# Patient Record
Sex: FEMALE | Race: WHITE | NOT HISPANIC OR LATINO | URBAN - METROPOLITAN AREA
[De-identification: names, ages, dates, MRNs, and addresses within clinical notes are randomized per-mention and may not be internally consistent; named-entity substitution may affect disease eponyms.]

---

## 2017-03-03 ENCOUNTER — EMERGENCY (EMERGENCY)
Facility: HOSPITAL | Age: 37
LOS: 1 days | Discharge: HOME | End: 2017-03-03
Attending: EMERGENCY MEDICINE
Payer: MEDICAID

## 2017-03-03 VITALS — HEIGHT: 65 IN | WEIGHT: 139.99 LBS

## 2017-03-03 VITALS — HEART RATE: 89 BPM

## 2017-03-03 DIAGNOSIS — R10.13 EPIGASTRIC PAIN: ICD-10-CM

## 2017-03-03 DIAGNOSIS — Z88.5 ALLERGY STATUS TO NARCOTIC AGENT: ICD-10-CM

## 2017-03-03 DIAGNOSIS — Z98.82 BREAST IMPLANT STATUS: Chronic | ICD-10-CM

## 2017-03-03 DIAGNOSIS — E11.9 TYPE 2 DIABETES MELLITUS WITHOUT COMPLICATIONS: ICD-10-CM

## 2017-03-03 DIAGNOSIS — M16.9 OSTEOARTHRITIS OF HIP, UNSPECIFIED: Chronic | ICD-10-CM

## 2017-03-03 DIAGNOSIS — Z98.84 BARIATRIC SURGERY STATUS: Chronic | ICD-10-CM

## 2017-03-03 DIAGNOSIS — Z90.89 ACQUIRED ABSENCE OF OTHER ORGANS: ICD-10-CM

## 2017-03-03 DIAGNOSIS — Z88.6 ALLERGY STATUS TO ANALGESIC AGENT: ICD-10-CM

## 2017-03-03 DIAGNOSIS — J45.909 UNSPECIFIED ASTHMA, UNCOMPLICATED: ICD-10-CM

## 2017-03-03 LAB
ALBUMIN SERPL ELPH-MCNC: 4.5 G/DL — SIGNIFICANT CHANGE UP (ref 3.3–5.2)
ALP SERPL-CCNC: 79 U/L — SIGNIFICANT CHANGE UP (ref 40–120)
ALT FLD-CCNC: 11 U/L — SIGNIFICANT CHANGE UP
ANION GAP SERPL CALC-SCNC: 12 MMOL/L — SIGNIFICANT CHANGE UP (ref 5–17)
APPEARANCE UR: CLEAR — SIGNIFICANT CHANGE UP
AST SERPL-CCNC: 16 U/L — SIGNIFICANT CHANGE UP
BASOPHILS # BLD AUTO: 0 K/UL — SIGNIFICANT CHANGE UP (ref 0–0.2)
BASOPHILS NFR BLD AUTO: 0.4 % — SIGNIFICANT CHANGE UP (ref 0–2)
BILIRUB SERPL-MCNC: 0.2 MG/DL — LOW (ref 0.4–2)
BILIRUB UR-MCNC: NEGATIVE — SIGNIFICANT CHANGE UP
BUN SERPL-MCNC: 15 MG/DL — SIGNIFICANT CHANGE UP (ref 8–20)
CALCIUM SERPL-MCNC: 9.3 MG/DL — SIGNIFICANT CHANGE UP (ref 8.6–10.2)
CHLORIDE SERPL-SCNC: 104 MMOL/L — SIGNIFICANT CHANGE UP (ref 98–107)
CO2 SERPL-SCNC: 25 MMOL/L — SIGNIFICANT CHANGE UP (ref 22–29)
COLOR SPEC: YELLOW — SIGNIFICANT CHANGE UP
CREAT SERPL-MCNC: 0.53 MG/DL — SIGNIFICANT CHANGE UP (ref 0.5–1.3)
DIFF PNL FLD: ABNORMAL
EOSINOPHIL # BLD AUTO: 0.1 K/UL — SIGNIFICANT CHANGE UP (ref 0–0.5)
EOSINOPHIL NFR BLD AUTO: 1.9 % — SIGNIFICANT CHANGE UP (ref 0–6)
EPI CELLS # UR: SIGNIFICANT CHANGE UP
GLUCOSE SERPL-MCNC: 100 MG/DL — SIGNIFICANT CHANGE UP (ref 70–115)
GLUCOSE UR QL: NEGATIVE MG/DL — SIGNIFICANT CHANGE UP
HCG SERPL-ACNC: <2 MIU/ML — SIGNIFICANT CHANGE UP
HCG UR QL: NEGATIVE — SIGNIFICANT CHANGE UP
HCT VFR BLD CALC: 37.9 % — SIGNIFICANT CHANGE UP (ref 37–47)
HGB BLD-MCNC: 11.8 G/DL — LOW (ref 12–16)
KETONES UR-MCNC: NEGATIVE — SIGNIFICANT CHANGE UP
LEUKOCYTE ESTERASE UR-ACNC: NEGATIVE — SIGNIFICANT CHANGE UP
LIDOCAIN IGE QN: 24 U/L — SIGNIFICANT CHANGE UP (ref 22–51)
LYMPHOCYTES # BLD AUTO: 2.1 K/UL — SIGNIFICANT CHANGE UP (ref 1–4.8)
LYMPHOCYTES # BLD AUTO: 30.9 % — SIGNIFICANT CHANGE UP (ref 20–55)
MCHC RBC-ENTMCNC: 27.3 PG — SIGNIFICANT CHANGE UP (ref 27–31)
MCHC RBC-ENTMCNC: 31.1 G/DL — LOW (ref 32–36)
MCV RBC AUTO: 87.5 FL — SIGNIFICANT CHANGE UP (ref 81–99)
MONOCYTES # BLD AUTO: 0.7 K/UL — SIGNIFICANT CHANGE UP (ref 0–0.8)
MONOCYTES NFR BLD AUTO: 10.6 % — HIGH (ref 3–10)
NEUTROPHILS # BLD AUTO: 3.8 K/UL — SIGNIFICANT CHANGE UP (ref 1.8–8)
NEUTROPHILS NFR BLD AUTO: 56.1 % — SIGNIFICANT CHANGE UP (ref 37–73)
NITRITE UR-MCNC: NEGATIVE — SIGNIFICANT CHANGE UP
PH UR: 6.5 — SIGNIFICANT CHANGE UP (ref 4.8–8)
PLATELET # BLD AUTO: 334 K/UL — SIGNIFICANT CHANGE UP (ref 150–400)
POTASSIUM SERPL-MCNC: 3.9 MMOL/L — SIGNIFICANT CHANGE UP (ref 3.5–5.3)
POTASSIUM SERPL-SCNC: 3.9 MMOL/L — SIGNIFICANT CHANGE UP (ref 3.5–5.3)
PROT SERPL-MCNC: 7.4 G/DL — SIGNIFICANT CHANGE UP (ref 6.6–8.7)
PROT UR-MCNC: NEGATIVE MG/DL — SIGNIFICANT CHANGE UP
RBC # BLD: 4.33 M/UL — LOW (ref 4.4–5.2)
RBC # FLD: 17.4 % — HIGH (ref 11–15.6)
RBC CASTS # UR COMP ASSIST: ABNORMAL /HPF (ref 0–4)
SODIUM SERPL-SCNC: 141 MMOL/L — SIGNIFICANT CHANGE UP (ref 135–145)
SP GR SPEC: 1.01 — SIGNIFICANT CHANGE UP (ref 1.01–1.02)
UROBILINOGEN FLD QL: NEGATIVE MG/DL — SIGNIFICANT CHANGE UP
WBC # BLD: 6.8 K/UL — SIGNIFICANT CHANGE UP (ref 4.8–10.8)
WBC # FLD AUTO: 6.8 K/UL — SIGNIFICANT CHANGE UP (ref 4.8–10.8)
WBC UR QL: SIGNIFICANT CHANGE UP

## 2017-03-03 PROCEDURE — 99284 EMERGENCY DEPT VISIT MOD MDM: CPT | Mod: 25

## 2017-03-03 PROCEDURE — 96374 THER/PROPH/DIAG INJ IV PUSH: CPT | Mod: XU

## 2017-03-03 PROCEDURE — 81025 URINE PREGNANCY TEST: CPT

## 2017-03-03 PROCEDURE — 74177 CT ABD & PELVIS W/CONTRAST: CPT

## 2017-03-03 PROCEDURE — 80053 COMPREHEN METABOLIC PANEL: CPT

## 2017-03-03 PROCEDURE — 83690 ASSAY OF LIPASE: CPT

## 2017-03-03 PROCEDURE — 74177 CT ABD & PELVIS W/CONTRAST: CPT | Mod: 26

## 2017-03-03 PROCEDURE — 84702 CHORIONIC GONADOTROPIN TEST: CPT

## 2017-03-03 PROCEDURE — 99284 EMERGENCY DEPT VISIT MOD MDM: CPT

## 2017-03-03 PROCEDURE — 81001 URINALYSIS AUTO W/SCOPE: CPT

## 2017-03-03 PROCEDURE — 85027 COMPLETE CBC AUTOMATED: CPT

## 2017-03-03 RX ORDER — ACETAMINOPHEN 500 MG
1000 TABLET ORAL ONCE
Qty: 0 | Refills: 0 | Status: COMPLETED | OUTPATIENT
Start: 2017-03-03 | End: 2017-03-03

## 2017-03-03 RX ORDER — SODIUM CHLORIDE 9 MG/ML
1000 INJECTION INTRAMUSCULAR; INTRAVENOUS; SUBCUTANEOUS ONCE
Qty: 0 | Refills: 0 | Status: COMPLETED | OUTPATIENT
Start: 2017-03-03 | End: 2017-03-03

## 2017-03-03 RX ADMIN — Medication 400 MILLIGRAM(S): at 12:13

## 2017-03-03 RX ADMIN — SODIUM CHLORIDE 1000 MILLILITER(S): 9 INJECTION INTRAMUSCULAR; INTRAVENOUS; SUBCUTANEOUS at 12:13

## 2017-03-03 NOTE — ED ADULT NURSE REASSESSMENT NOTE - NS ED NURSE REASSESS COMMENT FT1
Pt baseline mental status, upon return to ED from CT, pt sitting in WC refusing to go to RW6 stating "there's no privacy over there" "I'm not sitting in those chairs" "Everybody can see your business". RN JAYME placed call to charge nurse Sadie to mediate situation and speak with patient @ this time. Zabrina Amaro @ bedside @ this time for mediation.

## 2017-03-03 NOTE — ED STATDOCS - PSH
Labral tear of hip, degenerative    S/P Abdominoplasty  2004  S/P bilateral breast implants    S/P Cholecystectomy  2007  S/P gastric bypass  2010  Vaginocele  s/p repair

## 2017-03-03 NOTE — ED ADULT NURSE REASSESSMENT NOTE - NS ED NURSE REASSESS COMMENT FT1
upon dc pt argumentative cursing  states that she doesn't agree with her negative results.  prior in the visit pt self report past abuse of narcotics.  pt refusing vitals ripped out her own iv and left the facility

## 2017-03-03 NOTE — ED STATDOCS - MEDICAL DECISION MAKING DETAILS
abdominal pain possible bowel obstruction, Labs, CT scan abdominal pain: possible partial bowel obstruction vs gastric outlet obstruction.  Labs, CT scan

## 2017-03-03 NOTE — ED STATDOCS - PMH
Alcohol abuse    Asthma  last attack 2 YEARS AGO  Chronic back pain    Intussusception intestine  2013  Other type of migraine    Palpitations  pvc's  Type 2 diabetes mellitus without complication

## 2017-03-03 NOTE — ED STATDOCS - PROGRESS NOTE DETAILS
PA NOTE: Pt seen by intake physician and hpi/orders/plan reviewed. PT presenting to ED with complaints of epigastric pain x 3 days.  Patient has hx of intussusception 2x in the past and hx of gastric ulcers.  Pt states that this feels similar to her previous episodes of intussusception.  Denies nausea/vomiting/diarrhea  PE: GEN: Awake, alert,  NAD,  EYES: PERRL CARDIAC: Reg rate and rhythm, S1,S2, RRR  RESP: No distress noted. Lungs CTA bilaterally no wheeze, ronchi, rales. ABD: soft,  non-tender, no guarding. . NEURO: AOx3, no focal deficits   PLAN: labs and CT pt takes pantoprazole - 40mg bid for ulcer

## 2017-03-03 NOTE — ED STATDOCS - ATTENDING CONTRIBUTION TO CARE
I, Pardeep Granados, performed the initial face to face bedside interview with this patient regarding history of present illness, review of symptoms and relevant past medical, social and family history.  I completed an independent physical examination.  I was the provider who initially evaluated this patient.  The history, relevant review of systems, past medical and surgical history, medical decision making, and physical examination was documented by the scribe in my presence and I attest to the accuracy of the documentation. Follow-up on ordered tests (ie labs, radiologic studies) and re-evaluation of the patient's status has been communicated to the ACP.  Disposition of the patient will be based on test outcome and response to ED interventions.

## 2017-03-03 NOTE — ED STATDOCS - NS ED MD SCRIBE ATTENDING SCRIBE SECTIONS
REVIEW OF SYSTEMS/PAST MEDICAL/SURGICAL/SOCIAL HISTORY/HISTORY OF PRESENT ILLNESS/PHYSICAL EXAM/INTAKE ASSESSMENT/SCREENINGS/DISPOSITION/HIV/VITAL SIGNS( Pullset)

## 2017-03-03 NOTE — ED STATDOCS - OBJECTIVE STATEMENT
36 y/o F pt with hx of intussusception, asthma, Diabetes, abdominoplasty, cholecystectomy, gastric bypass and ulcers presents to ED c/o intermittent sharp abdominal pain forf 3 days. She states pain is worse with PO intake.  Pain is currently a 4/10 earlier was 8/10. Last bowel movement today 7am, but not passing gas. Pt is Methadone (taken this morning), Percocet, Neurontin  Fioricet, trokendi and flexeril. No vomiting. No fevers. No further complaints at this time. 38 y/o F pt with hx of intussusception, asthma, Diabetes, abdominoplasty, cholecystectomy, gastric bypass and ulcers presents to ED c/o intermittent sharp abdominal pain for 3 days. She states pain is worse with PO intake: reports bloating and bulging of epigastrium immediately after po intake with assoc sharp, stabbing discomfort and eructation.  Pain was unbearable last night:  currently a 4/10 earlier was 8/10. Last bowel movement today 7am, but not passing gas. Pt is Methadone (taken this morning), Percocet, Neurontin  Fioricet, trokendi and flexeril. No vomiting. No fevers. No further complaints at this time.

## 2017-07-14 ENCOUNTER — EMERGENCY (EMERGENCY)
Facility: HOSPITAL | Age: 37
LOS: 1 days | Discharge: HOME | End: 2017-07-14
Attending: EMERGENCY MEDICINE | Admitting: EMERGENCY MEDICINE
Payer: MEDICAID

## 2017-07-14 VITALS
TEMPERATURE: 98 F | OXYGEN SATURATION: 100 % | SYSTOLIC BLOOD PRESSURE: 126 MMHG | DIASTOLIC BLOOD PRESSURE: 74 MMHG | RESPIRATION RATE: 20 BRPM | HEART RATE: 96 BPM

## 2017-07-14 VITALS — WEIGHT: 128.97 LBS | HEIGHT: 65 IN

## 2017-07-14 DIAGNOSIS — Z98.82 BREAST IMPLANT STATUS: Chronic | ICD-10-CM

## 2017-07-14 DIAGNOSIS — Z98.84 BARIATRIC SURGERY STATUS: Chronic | ICD-10-CM

## 2017-07-14 DIAGNOSIS — M16.9 OSTEOARTHRITIS OF HIP, UNSPECIFIED: Chronic | ICD-10-CM

## 2017-07-14 LAB
ALBUMIN SERPL ELPH-MCNC: 3.9 G/DL — SIGNIFICANT CHANGE UP (ref 3.3–5.2)
ALP SERPL-CCNC: 71 U/L — SIGNIFICANT CHANGE UP (ref 40–120)
ALT FLD-CCNC: 7 U/L — SIGNIFICANT CHANGE UP
ANION GAP SERPL CALC-SCNC: 12 MMOL/L — SIGNIFICANT CHANGE UP (ref 5–17)
APPEARANCE UR: CLEAR — SIGNIFICANT CHANGE UP
AST SERPL-CCNC: 13 U/L — SIGNIFICANT CHANGE UP
BACTERIA # UR AUTO: ABNORMAL
BASOPHILS # BLD AUTO: 0 K/UL — SIGNIFICANT CHANGE UP (ref 0–0.2)
BASOPHILS NFR BLD AUTO: 0.4 % — SIGNIFICANT CHANGE UP (ref 0–2)
BILIRUB SERPL-MCNC: 0.1 MG/DL — LOW (ref 0.4–2)
BILIRUB UR-MCNC: NEGATIVE — SIGNIFICANT CHANGE UP
BUN SERPL-MCNC: 11 MG/DL — SIGNIFICANT CHANGE UP (ref 8–20)
CALCIUM SERPL-MCNC: 8.7 MG/DL — SIGNIFICANT CHANGE UP (ref 8.6–10.2)
CHLORIDE SERPL-SCNC: 105 MMOL/L — SIGNIFICANT CHANGE UP (ref 98–107)
CK SERPL-CCNC: 73 U/L — SIGNIFICANT CHANGE UP (ref 25–170)
CO2 SERPL-SCNC: 22 MMOL/L — SIGNIFICANT CHANGE UP (ref 22–29)
COLOR SPEC: YELLOW — SIGNIFICANT CHANGE UP
CREAT SERPL-MCNC: 0.5 MG/DL — SIGNIFICANT CHANGE UP (ref 0.5–1.3)
DIFF PNL FLD: ABNORMAL
EOSINOPHIL # BLD AUTO: 0.2 K/UL — SIGNIFICANT CHANGE UP (ref 0–0.5)
EOSINOPHIL NFR BLD AUTO: 2.6 % — SIGNIFICANT CHANGE UP (ref 0–6)
EPI CELLS # UR: SIGNIFICANT CHANGE UP
GLUCOSE SERPL-MCNC: 116 MG/DL — HIGH (ref 70–115)
GLUCOSE UR QL: NEGATIVE MG/DL — SIGNIFICANT CHANGE UP
HCG SERPL-ACNC: <2 MIU/ML — SIGNIFICANT CHANGE UP
HCT VFR BLD CALC: 31.7 % — LOW (ref 37–47)
HGB BLD-MCNC: 10 G/DL — LOW (ref 12–16)
INR BLD: 0.96 RATIO — SIGNIFICANT CHANGE UP (ref 0.88–1.16)
KETONES UR-MCNC: NEGATIVE — SIGNIFICANT CHANGE UP
LEUKOCYTE ESTERASE UR-ACNC: NEGATIVE — SIGNIFICANT CHANGE UP
LIDOCAIN IGE QN: 25 U/L — SIGNIFICANT CHANGE UP (ref 22–51)
LYMPHOCYTES # BLD AUTO: 3.1 K/UL — SIGNIFICANT CHANGE UP (ref 1–4.8)
LYMPHOCYTES # BLD AUTO: 42.1 % — SIGNIFICANT CHANGE UP (ref 20–55)
MCHC RBC-ENTMCNC: 28.3 PG — SIGNIFICANT CHANGE UP (ref 27–31)
MCHC RBC-ENTMCNC: 31.5 G/DL — LOW (ref 32–36)
MCV RBC AUTO: 89.8 FL — SIGNIFICANT CHANGE UP (ref 81–99)
MONOCYTES # BLD AUTO: 0.7 K/UL — SIGNIFICANT CHANGE UP (ref 0–0.8)
MONOCYTES NFR BLD AUTO: 9.4 % — SIGNIFICANT CHANGE UP (ref 3–10)
NEUTROPHILS # BLD AUTO: 3.3 K/UL — SIGNIFICANT CHANGE UP (ref 1.8–8)
NEUTROPHILS NFR BLD AUTO: 45.4 % — SIGNIFICANT CHANGE UP (ref 37–73)
NITRITE UR-MCNC: NEGATIVE — SIGNIFICANT CHANGE UP
PH UR: 7 — SIGNIFICANT CHANGE UP (ref 5–8)
PLATELET # BLD AUTO: 412 K/UL — HIGH (ref 150–400)
POTASSIUM SERPL-MCNC: 3.9 MMOL/L — SIGNIFICANT CHANGE UP (ref 3.5–5.3)
POTASSIUM SERPL-SCNC: 3.9 MMOL/L — SIGNIFICANT CHANGE UP (ref 3.5–5.3)
PROT SERPL-MCNC: 6.7 G/DL — SIGNIFICANT CHANGE UP (ref 6.6–8.7)
PROT UR-MCNC: NEGATIVE MG/DL — SIGNIFICANT CHANGE UP
PROTHROM AB SERPL-ACNC: 10.6 SEC — SIGNIFICANT CHANGE UP (ref 9.8–12.7)
RBC # BLD: 3.53 M/UL — LOW (ref 4.4–5.2)
RBC # FLD: 15.7 % — HIGH (ref 11–15.6)
RBC CASTS # UR COMP ASSIST: SIGNIFICANT CHANGE UP /HPF (ref 0–4)
SODIUM SERPL-SCNC: 139 MMOL/L — SIGNIFICANT CHANGE UP (ref 135–145)
SP GR SPEC: 1 — LOW (ref 1.01–1.02)
UROBILINOGEN FLD QL: NEGATIVE MG/DL — SIGNIFICANT CHANGE UP
WBC # BLD: 7.3 K/UL — SIGNIFICANT CHANGE UP (ref 4.8–10.8)
WBC # FLD AUTO: 7.3 K/UL — SIGNIFICANT CHANGE UP (ref 4.8–10.8)
WBC UR QL: SIGNIFICANT CHANGE UP

## 2017-07-14 PROCEDURE — 99284 EMERGENCY DEPT VISIT MOD MDM: CPT | Mod: 25

## 2017-07-14 PROCEDURE — 81001 URINALYSIS AUTO W/SCOPE: CPT

## 2017-07-14 PROCEDURE — 85610 PROTHROMBIN TIME: CPT

## 2017-07-14 PROCEDURE — 84702 CHORIONIC GONADOTROPIN TEST: CPT

## 2017-07-14 PROCEDURE — 93971 EXTREMITY STUDY: CPT | Mod: 26,RT

## 2017-07-14 PROCEDURE — 85027 COMPLETE CBC AUTOMATED: CPT

## 2017-07-14 PROCEDURE — 36415 COLL VENOUS BLD VENIPUNCTURE: CPT

## 2017-07-14 PROCEDURE — 82550 ASSAY OF CK (CPK): CPT

## 2017-07-14 PROCEDURE — 83690 ASSAY OF LIPASE: CPT

## 2017-07-14 PROCEDURE — 80053 COMPREHEN METABOLIC PANEL: CPT

## 2017-07-14 PROCEDURE — 93971 EXTREMITY STUDY: CPT

## 2017-07-14 RX ORDER — SODIUM CHLORIDE 9 MG/ML
3 INJECTION INTRAMUSCULAR; INTRAVENOUS; SUBCUTANEOUS ONCE
Qty: 0 | Refills: 0 | Status: COMPLETED | OUTPATIENT
Start: 2017-07-14 | End: 2017-07-14

## 2017-07-14 RX ORDER — SUCRALFATE 1 G
1 TABLET ORAL
Qty: 60 | Refills: 0 | OUTPATIENT
Start: 2017-07-14 | End: 2017-08-13

## 2017-07-14 RX ORDER — MOXIFLOXACIN HYDROCHLORIDE TABLETS, 400 MG 400 MG/1
1 TABLET, FILM COATED ORAL
Qty: 20 | Refills: 0 | OUTPATIENT
Start: 2017-07-14 | End: 2017-07-24

## 2017-07-14 RX ORDER — SODIUM CHLORIDE 9 MG/ML
1000 INJECTION INTRAMUSCULAR; INTRAVENOUS; SUBCUTANEOUS ONCE
Qty: 0 | Refills: 0 | Status: COMPLETED | OUTPATIENT
Start: 2017-07-14 | End: 2017-07-14

## 2017-07-14 RX ORDER — SODIUM CHLORIDE 9 MG/ML
1000 INJECTION INTRAMUSCULAR; INTRAVENOUS; SUBCUTANEOUS
Qty: 0 | Refills: 0 | Status: DISCONTINUED | OUTPATIENT
Start: 2017-07-14 | End: 2017-07-18

## 2017-07-14 RX ORDER — ONDANSETRON 8 MG/1
1 TABLET, FILM COATED ORAL
Qty: 12 | Refills: 0 | OUTPATIENT
Start: 2017-07-14 | End: 2017-07-18

## 2017-07-14 RX ORDER — METRONIDAZOLE 500 MG
1 TABLET ORAL
Qty: 30 | Refills: 0 | OUTPATIENT
Start: 2017-07-14 | End: 2017-07-24

## 2017-07-14 RX ADMIN — SODIUM CHLORIDE 1000 MILLILITER(S): 9 INJECTION INTRAMUSCULAR; INTRAVENOUS; SUBCUTANEOUS at 04:10

## 2017-07-14 RX ADMIN — SODIUM CHLORIDE 3 MILLILITER(S): 9 INJECTION INTRAMUSCULAR; INTRAVENOUS; SUBCUTANEOUS at 04:10

## 2017-07-14 NOTE — ED PROVIDER NOTE - OBJECTIVE STATEMENT
38 yo F with diarrhea and RLE pain. sx are constant and nothing makes sx better or worse. no cp or sob. no HA. sx are constant for 2 weeks

## 2017-07-14 NOTE — ED ADULT NURSE NOTE - OBJECTIVE STATEMENT
Pt states "I've had diarrhea for 12 days, I don't want to eat because I know it will go right through me, I started to have swelling in my feet starting yesterday, they're going down though", pt c/o nausea no vomiting, able to ambulate safely and steadily, pt w/ hx of stomach ulcers, pt states she buried her cat in her backyard 2 weeks ago, doesn't know if this could be related

## 2017-07-14 NOTE — ED PROVIDER NOTE - PHYSICAL EXAMINATION
VS: reviewed in triage note...  HEENT: NC/AT,   CV:s1,s2 no m/r/g  Lungs: cta b/l, no r/r/w  Abd: soft, diffuse abd pain  EXT: no c/c/e  Neuro:no focal defecits  skin: no rash  Pulses: dpp, pt 2+ b/l

## 2017-07-14 NOTE — ED PROVIDER NOTE - CARE PLAN
Principal Discharge DX:	Diarrhea of infectious origin  Secondary Diagnosis:	Pain of right lower extremity

## 2017-09-03 ENCOUNTER — INPATIENT (INPATIENT)
Facility: HOSPITAL | Age: 37
LOS: 1 days | Discharge: HOME | DRG: 344 | End: 2017-09-05
Attending: SURGERY | Admitting: SURGERY
Payer: MEDICAID

## 2017-09-03 ENCOUNTER — EMERGENCY (EMERGENCY)
Facility: HOSPITAL | Age: 37
LOS: 1 days | Discharge: OTHER INSTITUTION | End: 2017-09-03
Attending: EMERGENCY MEDICINE
Payer: MEDICAID

## 2017-09-03 ENCOUNTER — TRANSCRIPTION ENCOUNTER (OUTPATIENT)
Age: 37
End: 2017-09-03

## 2017-09-03 VITALS
OXYGEN SATURATION: 100 % | DIASTOLIC BLOOD PRESSURE: 66 MMHG | HEART RATE: 96 BPM | RESPIRATION RATE: 16 BRPM | TEMPERATURE: 97 F | SYSTOLIC BLOOD PRESSURE: 108 MMHG

## 2017-09-03 VITALS
DIASTOLIC BLOOD PRESSURE: 87 MMHG | SYSTOLIC BLOOD PRESSURE: 130 MMHG | RESPIRATION RATE: 20 BRPM | HEART RATE: 97 BPM | OXYGEN SATURATION: 98 %

## 2017-09-03 VITALS — HEIGHT: 65 IN | WEIGHT: 125 LBS

## 2017-09-03 DIAGNOSIS — K40.00 BILATERAL INGUINAL HERNIA, WITH OBSTRUCTION, WITHOUT GANGRENE, NOT SPECIFIED AS RECURRENT: ICD-10-CM

## 2017-09-03 DIAGNOSIS — Z98.82 BREAST IMPLANT STATUS: Chronic | ICD-10-CM

## 2017-09-03 DIAGNOSIS — K40 INGUINAL HERNIA: ICD-10-CM

## 2017-09-03 DIAGNOSIS — Z98.84 BARIATRIC SURGERY STATUS: Chronic | ICD-10-CM

## 2017-09-03 DIAGNOSIS — M16.9 OSTEOARTHRITIS OF HIP, UNSPECIFIED: Chronic | ICD-10-CM

## 2017-09-03 LAB
ALBUMIN SERPL ELPH-MCNC: 4.3 G/DL — SIGNIFICANT CHANGE UP (ref 3.3–5.2)
ALP SERPL-CCNC: 67 U/L — SIGNIFICANT CHANGE UP (ref 40–120)
ALT FLD-CCNC: 18 U/L — SIGNIFICANT CHANGE UP
ANION GAP SERPL CALC-SCNC: 18 MMOL/L — HIGH (ref 5–17)
APTT BLD: 26.3 SEC — LOW (ref 27.5–37.4)
AST SERPL-CCNC: 17 U/L — SIGNIFICANT CHANGE UP
BASOPHILS # BLD AUTO: 0 K/UL — SIGNIFICANT CHANGE UP (ref 0–0.2)
BASOPHILS NFR BLD AUTO: 0.4 % — SIGNIFICANT CHANGE UP (ref 0–2)
BILIRUB SERPL-MCNC: 0.2 MG/DL — LOW (ref 0.4–2)
BUN SERPL-MCNC: 10 MG/DL — SIGNIFICANT CHANGE UP (ref 8–20)
CALCIUM SERPL-MCNC: 8.9 MG/DL — SIGNIFICANT CHANGE UP (ref 8.6–10.2)
CHLORIDE SERPL-SCNC: 107 MMOL/L — SIGNIFICANT CHANGE UP (ref 98–107)
CO2 SERPL-SCNC: 17 MMOL/L — LOW (ref 22–29)
CREAT SERPL-MCNC: 0.62 MG/DL — SIGNIFICANT CHANGE UP (ref 0.5–1.3)
EOSINOPHIL # BLD AUTO: 0.1 K/UL — SIGNIFICANT CHANGE UP (ref 0–0.5)
EOSINOPHIL NFR BLD AUTO: 1.9 % — SIGNIFICANT CHANGE UP (ref 0–6)
GLUCOSE SERPL-MCNC: 215 MG/DL — HIGH (ref 70–115)
HCT VFR BLD CALC: 35.5 % — LOW (ref 37–47)
HGB BLD-MCNC: 11 G/DL — LOW (ref 12–16)
INR BLD: 0.92 RATIO — SIGNIFICANT CHANGE UP (ref 0.88–1.16)
LACTATE BLDV-MCNC: 3.7 MMOL/L — HIGH (ref 0.5–2)
LIDOCAIN IGE QN: 33 U/L — SIGNIFICANT CHANGE UP (ref 22–51)
LYMPHOCYTES # BLD AUTO: 3.3 K/UL — SIGNIFICANT CHANGE UP (ref 1–4.8)
LYMPHOCYTES # BLD AUTO: 46.1 % — SIGNIFICANT CHANGE UP (ref 20–55)
MAGNESIUM SERPL-MCNC: 1.9 MG/DL — SIGNIFICANT CHANGE UP (ref 1.6–2.6)
MCHC RBC-ENTMCNC: 26.8 PG — LOW (ref 27–31)
MCHC RBC-ENTMCNC: 31 G/DL — LOW (ref 32–36)
MCV RBC AUTO: 86.6 FL — SIGNIFICANT CHANGE UP (ref 81–99)
MONOCYTES # BLD AUTO: 0.6 K/UL — SIGNIFICANT CHANGE UP (ref 0–0.8)
MONOCYTES NFR BLD AUTO: 7.7 % — SIGNIFICANT CHANGE UP (ref 3–10)
NEUTROPHILS # BLD AUTO: 3.2 K/UL — SIGNIFICANT CHANGE UP (ref 1.8–8)
NEUTROPHILS NFR BLD AUTO: 43.8 % — SIGNIFICANT CHANGE UP (ref 37–73)
PLATELET # BLD AUTO: 444 K/UL — HIGH (ref 150–400)
POTASSIUM SERPL-MCNC: 3.6 MMOL/L — SIGNIFICANT CHANGE UP (ref 3.5–5.3)
POTASSIUM SERPL-SCNC: 3.6 MMOL/L — SIGNIFICANT CHANGE UP (ref 3.5–5.3)
PROT SERPL-MCNC: 7.1 G/DL — SIGNIFICANT CHANGE UP (ref 6.6–8.7)
PROTHROM AB SERPL-ACNC: 10.1 SEC — SIGNIFICANT CHANGE UP (ref 9.8–12.7)
RBC # BLD: 4.1 M/UL — LOW (ref 4.4–5.2)
RBC # FLD: 16.1 % — HIGH (ref 11–15.6)
SODIUM SERPL-SCNC: 142 MMOL/L — SIGNIFICANT CHANGE UP (ref 135–145)
TSH SERPL-MCNC: 3.34 UIU/ML — SIGNIFICANT CHANGE UP (ref 0.27–4.2)
WBC # BLD: 7.2 K/UL — SIGNIFICANT CHANGE UP (ref 4.8–10.8)
WBC # FLD AUTO: 7.2 K/UL — SIGNIFICANT CHANGE UP (ref 4.8–10.8)

## 2017-09-03 PROCEDURE — 99285 EMERGENCY DEPT VISIT HI MDM: CPT | Mod: 25

## 2017-09-03 PROCEDURE — 36415 COLL VENOUS BLD VENIPUNCTURE: CPT

## 2017-09-03 PROCEDURE — 83690 ASSAY OF LIPASE: CPT

## 2017-09-03 PROCEDURE — 85610 PROTHROMBIN TIME: CPT

## 2017-09-03 PROCEDURE — 83605 ASSAY OF LACTIC ACID: CPT

## 2017-09-03 PROCEDURE — 96374 THER/PROPH/DIAG INJ IV PUSH: CPT | Mod: XU

## 2017-09-03 PROCEDURE — 83735 ASSAY OF MAGNESIUM: CPT

## 2017-09-03 PROCEDURE — 96375 TX/PRO/DX INJ NEW DRUG ADDON: CPT

## 2017-09-03 PROCEDURE — 80053 COMPREHEN METABOLIC PANEL: CPT

## 2017-09-03 PROCEDURE — 74177 CT ABD & PELVIS W/CONTRAST: CPT

## 2017-09-03 PROCEDURE — 96376 TX/PRO/DX INJ SAME DRUG ADON: CPT

## 2017-09-03 PROCEDURE — 84443 ASSAY THYROID STIM HORMONE: CPT

## 2017-09-03 PROCEDURE — 85730 THROMBOPLASTIN TIME PARTIAL: CPT

## 2017-09-03 PROCEDURE — 74177 CT ABD & PELVIS W/CONTRAST: CPT | Mod: 26

## 2017-09-03 PROCEDURE — 84702 CHORIONIC GONADOTROPIN TEST: CPT

## 2017-09-03 PROCEDURE — 71010: CPT | Mod: 26

## 2017-09-03 PROCEDURE — 85027 COMPLETE CBC AUTOMATED: CPT

## 2017-09-03 PROCEDURE — 71045 X-RAY EXAM CHEST 1 VIEW: CPT

## 2017-09-03 RX ORDER — ONDANSETRON 8 MG/1
4 TABLET, FILM COATED ORAL ONCE
Qty: 0 | Refills: 0 | Status: COMPLETED | OUTPATIENT
Start: 2017-09-03 | End: 2017-09-03

## 2017-09-03 RX ORDER — ONDANSETRON 8 MG/1
4 TABLET, FILM COATED ORAL ONCE
Qty: 0 | Refills: 0 | Status: DISCONTINUED | OUTPATIENT
Start: 2017-09-03 | End: 2017-09-03

## 2017-09-03 RX ORDER — OXYCODONE AND ACETAMINOPHEN 5; 325 MG/1; MG/1
1 TABLET ORAL EVERY 4 HOURS
Qty: 0 | Refills: 0 | Status: DISCONTINUED | OUTPATIENT
Start: 2017-09-03 | End: 2017-09-05

## 2017-09-03 RX ORDER — PROPRANOLOL HCL 160 MG
20 CAPSULE, EXTENDED RELEASE 24HR ORAL
Qty: 0 | Refills: 0 | Status: DISCONTINUED | OUTPATIENT
Start: 2017-09-03 | End: 2017-09-03

## 2017-09-03 RX ORDER — CYCLOBENZAPRINE HYDROCHLORIDE 10 MG/1
20 TABLET, FILM COATED ORAL AT BEDTIME
Qty: 0 | Refills: 0 | Status: DISCONTINUED | OUTPATIENT
Start: 2017-09-03 | End: 2017-09-05

## 2017-09-03 RX ORDER — PANTOPRAZOLE SODIUM 20 MG/1
40 TABLET, DELAYED RELEASE ORAL
Qty: 0 | Refills: 0 | Status: DISCONTINUED | OUTPATIENT
Start: 2017-09-03 | End: 2017-09-03

## 2017-09-03 RX ORDER — ACETAMINOPHEN 500 MG
1000 TABLET ORAL ONCE
Qty: 0 | Refills: 0 | Status: COMPLETED | OUTPATIENT
Start: 2017-09-03 | End: 2017-09-03

## 2017-09-03 RX ORDER — METOCLOPRAMIDE HCL 10 MG
10 TABLET ORAL ONCE
Qty: 0 | Refills: 0 | Status: COMPLETED | OUTPATIENT
Start: 2017-09-03 | End: 2017-09-03

## 2017-09-03 RX ORDER — CYCLOBENZAPRINE HYDROCHLORIDE 10 MG/1
10 TABLET, FILM COATED ORAL EVERY 12 HOURS
Qty: 0 | Refills: 0 | Status: DISCONTINUED | OUTPATIENT
Start: 2017-09-03 | End: 2017-09-03

## 2017-09-03 RX ORDER — ALBUTEROL 90 UG/1
1 AEROSOL, METERED ORAL THREE TIMES A DAY
Qty: 0 | Refills: 0 | Status: DISCONTINUED | OUTPATIENT
Start: 2017-09-03 | End: 2017-09-05

## 2017-09-03 RX ORDER — OXYCODONE AND ACETAMINOPHEN 5; 325 MG/1; MG/1
2 TABLET ORAL EVERY 4 HOURS
Qty: 0 | Refills: 0 | Status: DISCONTINUED | OUTPATIENT
Start: 2017-09-03 | End: 2017-09-05

## 2017-09-03 RX ORDER — ALBUTEROL 90 UG/1
1 AEROSOL, METERED ORAL THREE TIMES A DAY
Qty: 0 | Refills: 0 | Status: DISCONTINUED | OUTPATIENT
Start: 2017-09-03 | End: 2017-09-03

## 2017-09-03 RX ORDER — MORPHINE SULFATE 50 MG/1
4 CAPSULE, EXTENDED RELEASE ORAL ONCE
Qty: 0 | Refills: 0 | Status: DISCONTINUED | OUTPATIENT
Start: 2017-09-03 | End: 2017-09-03

## 2017-09-03 RX ORDER — VENLAFAXINE HCL 75 MG
37.5 CAPSULE, EXT RELEASE 24 HR ORAL DAILY
Qty: 0 | Refills: 0 | Status: DISCONTINUED | OUTPATIENT
Start: 2017-09-03 | End: 2017-09-05

## 2017-09-03 RX ORDER — VENLAFAXINE HCL 75 MG
37.5 CAPSULE, EXT RELEASE 24 HR ORAL DAILY
Qty: 0 | Refills: 0 | Status: DISCONTINUED | OUTPATIENT
Start: 2017-09-03 | End: 2017-09-03

## 2017-09-03 RX ORDER — HEPARIN SODIUM 5000 [USP'U]/ML
5000 INJECTION INTRAVENOUS; SUBCUTANEOUS EVERY 8 HOURS
Qty: 0 | Refills: 0 | Status: DISCONTINUED | OUTPATIENT
Start: 2017-09-03 | End: 2017-09-05

## 2017-09-03 RX ORDER — HYDROMORPHONE HYDROCHLORIDE 2 MG/ML
0.5 INJECTION INTRAMUSCULAR; INTRAVENOUS; SUBCUTANEOUS
Qty: 0 | Refills: 0 | Status: DISCONTINUED | OUTPATIENT
Start: 2017-09-03 | End: 2017-09-03

## 2017-09-03 RX ORDER — ONDANSETRON 8 MG/1
4 TABLET, FILM COATED ORAL EVERY 6 HOURS
Qty: 0 | Refills: 0 | Status: DISCONTINUED | OUTPATIENT
Start: 2017-09-03 | End: 2017-09-03

## 2017-09-03 RX ORDER — PANTOPRAZOLE SODIUM 20 MG/1
40 TABLET, DELAYED RELEASE ORAL
Qty: 0 | Refills: 0 | Status: DISCONTINUED | OUTPATIENT
Start: 2017-09-03 | End: 2017-09-05

## 2017-09-03 RX ORDER — SODIUM CHLORIDE 9 MG/ML
1000 INJECTION, SOLUTION INTRAVENOUS
Qty: 0 | Refills: 0 | Status: DISCONTINUED | OUTPATIENT
Start: 2017-09-03 | End: 2017-09-05

## 2017-09-03 RX ORDER — PROPRANOLOL HCL 160 MG
20 CAPSULE, EXTENDED RELEASE 24HR ORAL
Qty: 0 | Refills: 0 | Status: DISCONTINUED | OUTPATIENT
Start: 2017-09-03 | End: 2017-09-05

## 2017-09-03 RX ORDER — MORPHINE SULFATE 50 MG/1
2 CAPSULE, EXTENDED RELEASE ORAL ONCE
Qty: 0 | Refills: 0 | Status: DISCONTINUED | OUTPATIENT
Start: 2017-09-03 | End: 2017-09-03

## 2017-09-03 RX ORDER — GABAPENTIN 400 MG/1
400 CAPSULE ORAL
Qty: 0 | Refills: 0 | Status: DISCONTINUED | OUTPATIENT
Start: 2017-09-03 | End: 2017-09-04

## 2017-09-03 RX ORDER — LIDOCAINE 4 G/100G
1 CREAM TOPICAL DAILY
Qty: 0 | Refills: 0 | Status: DISCONTINUED | OUTPATIENT
Start: 2017-09-03 | End: 2017-09-05

## 2017-09-03 RX ORDER — SUMATRIPTAN SUCCINATE 4 MG/.5ML
25 INJECTION, SOLUTION SUBCUTANEOUS ONCE
Qty: 0 | Refills: 0 | Status: DISCONTINUED | OUTPATIENT
Start: 2017-09-03 | End: 2017-09-05

## 2017-09-03 RX ORDER — SUCRALFATE 1 G
1 TABLET ORAL
Qty: 0 | Refills: 0 | Status: DISCONTINUED | OUTPATIENT
Start: 2017-09-03 | End: 2017-09-05

## 2017-09-03 RX ORDER — CYCLOBENZAPRINE HYDROCHLORIDE 10 MG/1
10 TABLET, FILM COATED ORAL
Qty: 0 | Refills: 0 | Status: DISCONTINUED | OUTPATIENT
Start: 2017-09-03 | End: 2017-09-05

## 2017-09-03 RX ORDER — GABAPENTIN 400 MG/1
400 CAPSULE ORAL
Qty: 0 | Refills: 0 | Status: DISCONTINUED | OUTPATIENT
Start: 2017-09-03 | End: 2017-09-05

## 2017-09-03 RX ORDER — SUCRALFATE 1 G
1 TABLET ORAL
Qty: 0 | Refills: 0 | Status: DISCONTINUED | OUTPATIENT
Start: 2017-09-03 | End: 2017-09-03

## 2017-09-03 RX ORDER — PANTOPRAZOLE SODIUM 20 MG/1
40 TABLET, DELAYED RELEASE ORAL ONCE
Qty: 0 | Refills: 0 | Status: COMPLETED | OUTPATIENT
Start: 2017-09-03 | End: 2017-09-03

## 2017-09-03 RX ORDER — SODIUM CHLORIDE 9 MG/ML
1000 INJECTION INTRAMUSCULAR; INTRAVENOUS; SUBCUTANEOUS
Qty: 0 | Refills: 0 | Status: DISCONTINUED | OUTPATIENT
Start: 2017-09-03 | End: 2017-09-07

## 2017-09-03 RX ORDER — MORPHINE SULFATE 50 MG/1
8 CAPSULE, EXTENDED RELEASE ORAL ONCE
Qty: 0 | Refills: 0 | Status: DISCONTINUED | OUTPATIENT
Start: 2017-09-03 | End: 2017-09-03

## 2017-09-03 RX ORDER — SODIUM CHLORIDE 9 MG/ML
999 INJECTION INTRAMUSCULAR; INTRAVENOUS; SUBCUTANEOUS ONCE
Qty: 0 | Refills: 0 | Status: COMPLETED | OUTPATIENT
Start: 2017-09-03 | End: 2017-09-03

## 2017-09-03 RX ORDER — METHADONE HYDROCHLORIDE 40 MG/1
10 TABLET ORAL
Qty: 0 | Refills: 0 | Status: DISCONTINUED | OUTPATIENT
Start: 2017-09-03 | End: 2017-09-05

## 2017-09-03 RX ORDER — METHADONE HYDROCHLORIDE 40 MG/1
10 TABLET ORAL EVERY 12 HOURS
Qty: 0 | Refills: 0 | Status: DISCONTINUED | OUTPATIENT
Start: 2017-09-03 | End: 2017-09-03

## 2017-09-03 RX ADMIN — MORPHINE SULFATE 8 MILLIGRAM(S): 50 CAPSULE, EXTENDED RELEASE ORAL at 08:43

## 2017-09-03 RX ADMIN — CYCLOBENZAPRINE HYDROCHLORIDE 20 MILLIGRAM(S): 10 TABLET, FILM COATED ORAL at 22:25

## 2017-09-03 RX ADMIN — GABAPENTIN 400 MILLIGRAM(S): 400 CAPSULE ORAL at 13:04

## 2017-09-03 RX ADMIN — GABAPENTIN 400 MILLIGRAM(S): 400 CAPSULE ORAL at 23:56

## 2017-09-03 RX ADMIN — Medication 1 TABLET(S): at 22:24

## 2017-09-03 RX ADMIN — HEPARIN SODIUM 5000 UNIT(S): 5000 INJECTION INTRAVENOUS; SUBCUTANEOUS at 22:22

## 2017-09-03 RX ADMIN — PANTOPRAZOLE SODIUM 40 MILLIGRAM(S): 20 TABLET, DELAYED RELEASE ORAL at 03:03

## 2017-09-03 RX ADMIN — Medication 104 MILLIGRAM(S): at 03:03

## 2017-09-03 RX ADMIN — MORPHINE SULFATE 8 MILLIGRAM(S): 50 CAPSULE, EXTENDED RELEASE ORAL at 08:08

## 2017-09-03 RX ADMIN — OXYCODONE AND ACETAMINOPHEN 2 TABLET(S): 5; 325 TABLET ORAL at 17:02

## 2017-09-03 RX ADMIN — Medication 400 MILLIGRAM(S): at 04:01

## 2017-09-03 RX ADMIN — METHADONE HYDROCHLORIDE 10 MILLIGRAM(S): 40 TABLET ORAL at 17:02

## 2017-09-03 RX ADMIN — OXYCODONE AND ACETAMINOPHEN 2 TABLET(S): 5; 325 TABLET ORAL at 13:04

## 2017-09-03 RX ADMIN — LIDOCAINE 1 PATCH: 4 CREAM TOPICAL at 13:04

## 2017-09-03 RX ADMIN — SODIUM CHLORIDE 999 MILLILITER(S): 9 INJECTION INTRAMUSCULAR; INTRAVENOUS; SUBCUTANEOUS at 03:03

## 2017-09-03 RX ADMIN — OXYCODONE AND ACETAMINOPHEN 2 TABLET(S): 5; 325 TABLET ORAL at 22:00

## 2017-09-03 RX ADMIN — Medication 100 MILLIGRAM(S): at 17:02

## 2017-09-03 RX ADMIN — MORPHINE SULFATE 4 MILLIGRAM(S): 50 CAPSULE, EXTENDED RELEASE ORAL at 07:30

## 2017-09-03 RX ADMIN — OXYCODONE AND ACETAMINOPHEN 2 TABLET(S): 5; 325 TABLET ORAL at 17:35

## 2017-09-03 RX ADMIN — OXYCODONE AND ACETAMINOPHEN 2 TABLET(S): 5; 325 TABLET ORAL at 21:29

## 2017-09-03 RX ADMIN — GABAPENTIN 400 MILLIGRAM(S): 400 CAPSULE ORAL at 17:02

## 2017-09-03 RX ADMIN — MORPHINE SULFATE 4 MILLIGRAM(S): 50 CAPSULE, EXTENDED RELEASE ORAL at 07:01

## 2017-09-03 RX ADMIN — Medication 1 TABLET(S): at 23:00

## 2017-09-03 RX ADMIN — HEPARIN SODIUM 5000 UNIT(S): 5000 INJECTION INTRAVENOUS; SUBCUTANEOUS at 13:04

## 2017-09-03 RX ADMIN — MORPHINE SULFATE 2 MILLIGRAM(S): 50 CAPSULE, EXTENDED RELEASE ORAL at 23:52

## 2017-09-03 RX ADMIN — ONDANSETRON 4 MILLIGRAM(S): 8 TABLET, FILM COATED ORAL at 22:21

## 2017-09-03 RX ADMIN — OXYCODONE AND ACETAMINOPHEN 2 TABLET(S): 5; 325 TABLET ORAL at 13:35

## 2017-09-03 RX ADMIN — PANTOPRAZOLE SODIUM 40 MILLIGRAM(S): 20 TABLET, DELAYED RELEASE ORAL at 17:02

## 2017-09-03 RX ADMIN — SODIUM CHLORIDE 250 MILLILITER(S): 9 INJECTION INTRAMUSCULAR; INTRAVENOUS; SUBCUTANEOUS at 07:01

## 2017-09-03 NOTE — CONSULT NOTE ADULT - SUBJECTIVE AND OBJECTIVE BOX
36 y/o F PSHx RNY gastric bypass 8 years prior, SBR for intussusception presents with 1 day h/o of acute epigastric abd pain acutely worsening with associated NBNB emesis X3. Pt states last BM was last night normal formed nonbloody. Pt states pain first occurred 1 month prior with diarrhea, was seen in [facility] ED discharged with flagyl, pain has persisted. Denies chest pain, SOB.    PMHx: DM, Asthma, Chronic pain, Herniated disc, Migraines  PSHx: RNY gastric bypass, Hernia repair with Mesh placement, Ex lap with SBR, L hip surgery, abdominoplasty  Medications: Albuterol, Troxendi, Fiorecet, Methadone, Percocet, Atatax, Flexeril, Neurontin  Allergies: Sulfa drugs, Dilaudid, Toradol, Tramadol    Vital Signs Last 24 Hrs  T(C): --  T(F): --  HR: 97 (03 Sep 2017 07:02) (69 - 97)  BP: 130/87 (03 Sep 2017 07:02) (95/65 - 130/87)  BP(mean): --  RR: 20 (03 Sep 2017 07:02) (20 - 22)  SpO2: 98% (03 Sep 2017 07:02) (98% - 98%)    Distressed from pain  GCS 15, AAOX3  Head NCAT, EOMI, PERRLA  Neck trachea midline, no masses  Chest expansion symmetric, Lungs CTAB  RRR, S1S2nl  Abd soft, ND, Epigastric TTP, otherwise soft no  rigidity/rebound/guarding  Ext: FROM, SILT, Strength 5/5    Labs: Lactate 3.7 otherwise unremarkable  CTA/P: Swirling, mesenteric edema, Free fluid consistent with internal hernia

## 2017-09-03 NOTE — DISCHARGE NOTE ADULT - ADDITIONAL INSTRUCTIONS
WOUND CARE: Leave top dressings on for 48 hours. After 48 hours, you may remove top bandages, but leave under bandage - Steristrips - in place until they fall off on their own. You may shower after 48 hours.   BATHING: Please do not submerge wound underwater. You may shower and/or sponge bathe.  ACTIVITY: No heavy lifting or straining. Otherwise, you may return to your usual level of physical activity. If you are taking narcotic pain medication (such as Percocet), do NOT drive a car, operate machinery or make important decisions.  DIET: Return to your usual diet.  NOTIFY YOUR SURGEON IF: You have any bleeding that does not stop, any pus draining from your wound, any fever (over 100.4 F) or chills, persistent nausea/vomiting, persistent diarrhea, or if your pain is not controlled on your discharge pain medications.  FOLLOW-UP:  1. Please call to make a follow-up appointment within one week of discharge  2. Please follow up with your primary care physician in one week regarding your hospitalization.

## 2017-09-03 NOTE — H&P ADULT - NSHPREVIEWOFSYSTEMS_GEN_ALL_CORE
Constitutional: No fevers, chills, no recent weight loss  ENMT: No changes in hearing, no changes in vision, no sore throat, no cough  Respiratory: No shortness of breath  Cardiovascular: No chest pain, palpitations  Gastrointestinal: No abdominal pain, no diarrhea/constipation  Genitourinary: No dysuria, frequency, or urgency    Extremities: No joint swelling, no limited range of movement  Neurological: No paresthesia  Skin: No rashes

## 2017-09-03 NOTE — ED PROVIDER NOTE - CARE PLAN
Principal Discharge DX:	Abdominal pain  Secondary Diagnosis:	Ascites  Secondary Diagnosis:	Ovarian cyst Principal Discharge DX:	Internal hernia  Secondary Diagnosis:	Ascites  Secondary Diagnosis:	Ovarian cyst

## 2017-09-03 NOTE — PROGRESS NOTE ADULT - SUBJECTIVE AND OBJECTIVE BOX
Green Team Surgery  Post-operative Check    SUBJECTIVE: Continues to complain of some upper abdominal pain. The patient also complains of some nausea after eating soup. No vomiting. The patient's eason was removed 4 hours ago and she has not voided yet.     OBJECTIVE:  ICU Vital Signs Last 24 Hrs  T(C): 36.8 (03 Sep 2017 15:01), Max: 36.8 (03 Sep 2017 13:45)  HR: 90 (03 Sep 2017 15:01) (69 - 97)  BP: 107/73 (03 Sep 2017 15:01) (95/65 - 130/87)  RR: 18 (03 Sep 2017 15:01) (16 - 22)  SpO2: 99% (03 Sep 2017 15:01) (95% - 100%)    Physical Exam:  - General: alert, interactive, agitated, standing throughout the exam and interview  - Abd: soft, moderate distention, tender to upper abdomen     MEDICATIONS  (STANDING):  heparin  Injectable 5000 Unit(s) SubCutaneous every 8 hours  lactated ringers. 1000 milliLiter(s) (75 mL/Hr) IV Continuous <Continuous>  sucralfate 1 Gram(s) Oral Before meals and at bedtime  propranolol 20 milliGRAM(s) Oral two times a day  ALBUTerol    90 MICROgram(s) HFA Inhaler 1 Puff(s) Inhalation three times a day  methadone    Tablet 10 milliGRAM(s) Oral two times a day  venlafaxine 37.5 milliGRAM(s) Oral daily  gabapentin 400 milliGRAM(s) Oral four times a day  SUMAtriptan 25 milliGRAM(s) Oral once  gabapentin 400 milliGRAM(s) Oral four times a day  benzonatate 100 milliGRAM(s) Oral two times a day  lidocaine   Patch 1 Patch Transdermal daily  cyclobenzaprine 20 milliGRAM(s) Oral at bedtime  pantoprazole    Tablet 40 milliGRAM(s) Oral two times a day before meals    MEDICATIONS  (PRN):  oxyCODONE    5 mG/acetaminophen 325 mG 1 Tablet(s) Oral every 4 hours PRN Moderate Pain (4 - 6)  oxyCODONE    5 mG/acetaminophen 325 mG 2 Tablet(s) Oral every 4 hours PRN Severe Pain (7 - 10)  aluminum hydroxide/magnesium hydroxide/simethicone Suspension 30 milliLiter(s) Oral every 6 hours PRN Dyspepsia  cyclobenzaprine 10 milliGRAM(s) Oral every 12 hours PRN Muscle Spasm  acetaminophen 325 mG/butalbital 50 mG/caffeine 40 mG 1 Tablet(s) Oral every 6 hours PRN headache    ASSESSMENT: Jessica Cuba is a 37 y.o. woman POD 0 from laparoscopic reduction and closure of internal hernia. Continued pain, but tolerating small amounts of regular diet.    PLAN:  - Regular diet as tolerated  - Due to void at 1930 today  - Restart all home medications  - Ambulate

## 2017-09-03 NOTE — DISCHARGE NOTE ADULT - NS AS ACTIVITY OBS
No Heavy lifting/straining/Stairs allowed/Driving allowed/Do not drive while taking narcotic pain medications./Showering allowed/Sex allowed/Walking-Indoors allowed/Return to Work/School allowed/Walking-Outdoors allowed

## 2017-09-03 NOTE — ED PROVIDER NOTE - PROGRESS NOTE DETAILS
spoke to surgery at 5am  will come to see patient await surgery, ct appears abn, with ascites, and small bowel thickening  reviewed radiology reading, read neg by vrad, spoke to radiology will review reading  discussed with patient possible int hernia

## 2017-09-03 NOTE — ED ADULT NURSE NOTE - OBJECTIVE STATEMENT
Pt came into ED and threw herself on floor, states "I've never had pain like this in my life even during childbirth", pt states "my insides are ripping apart and going to come out of my body", pt states she vomited 2x at home, one time w/ food and other with bile, pt ate 45 minutes prior to onset of pain

## 2017-09-03 NOTE — DISCHARGE NOTE ADULT - HOSPITAL COURSE
The patient presented to [facility] with 1 day of epigastric abdominal pain, nausea, and vomiting. In the ED, the patient underwent a CT scan, which showed "...interval change in location of the JJ anastomosis with mesenteric swirling, edema, ascites and narrowing of the SMV highly concerning for internal hernia and possible closed loop obstruction in this gastric bypass patient." The patient was promptly transferred to [facility] for surgical intervention. The patient underwent a laparoscopic reduction of her internal hernia with closure of the fascial defect with a running, silk suture. The patient's diet was advanced on POD 0, which she tolerated well. Following surgery, the patient's pain was also resolved. Given the patient's significantly improved clinical picture, good pain control, and tolerance of diet, she was deemed appropriate for outpatient follow up and discharged on POD 0. The patient presented to [facility] with 1 day of epigastric abdominal pain, nausea, and vomiting. In the ED, the patient underwent a CT scan, which showed "...interval change in location of the JJ anastomosis with mesenteric swirling, edema, ascites and narrowing of the SMV highly concerning for internal hernia and possible closed loop obstruction in this gastric bypass patient." The patient was promptly transferred to [facility] for surgical intervention. The patient underwent a laparoscopic reduction of her internal hernia with closure of the fascial defect with a running, silk suture. The patient's diet was advanced on POD 0, which she tolerated well in small volumes, which increased over the subsequent days. The patient was unable to void upon initial removal of her Sy catheter, but voided successfully on POD 2. The patient also complained of some leg swelling on POD 2. An ultrasound was negative for DVT.

## 2017-09-03 NOTE — ED PROVIDER NOTE - NS ED ROS FT
no weight change, no fever or chills  no rash, no bruises  no visual changes no eye discharge  no cough cold or congestion,   no sob, no chest pain  no orthopnea, no pnd  hpi  no hematuria, no change in urinary habits  no joint pain, no deformity  no headache, no paresthesia

## 2017-09-03 NOTE — DISCHARGE NOTE ADULT - CARE PLAN
Principal Discharge DX:	Internal hernia  Goal:	Follow up with surgery.  Instructions for follow-up, activity and diet:	Do no lift heavy things or perform strenuous exercise before following up with your surgeon.

## 2017-09-03 NOTE — DISCHARGE NOTE ADULT - CARE PROVIDERS DIRECT ADDRESSES
[email or web address]

## 2017-09-03 NOTE — ED ADULT NURSE REASSESSMENT NOTE - NS ED NURSE REASSESS COMMENT FT1
Pt acting out at time of transfer.  Pt wants to go out and smoke, policy states she cannot with an IV.  Episode deescalated and pt is now on stretcher willingly.  Report given to EMS Glenn
Pt complaining of 8/10 upper quad abdominal pain.  Alerted by Dr. Major pt is being transferred to [place] for emergency surgery.  Pt has been NPO since midnight.

## 2017-09-03 NOTE — ED PROVIDER NOTE - MEDICAL DECISION MAKING DETAILS
37y old with previosu h/o substance abuse, gastric bypass, intuss, abd plasty, presents with acute abd apin, 37y old with previous h/o substance abuse, gastric bypass, intuss, abd plasty, presents with acute abd pain, plan pain control, fluids, surgery

## 2017-09-03 NOTE — H&P ADULT - ATTENDING COMMENTS
Pt seen examined and CT reviewed.  Appears to have internal hernia after RYGB by history, exam and CT.  Needs urgent exploration, lap / open.  Risks, benefits and alternatives discussed in detail.  All questions addressed.

## 2017-09-03 NOTE — DISCHARGE NOTE ADULT - CARE PROVIDER_API CALL
Tadeo Montana), Surgery  [address] [facility]  [address] [place] [id number]  Phone: [phone]  Fax: [phone]

## 2017-09-03 NOTE — DISCHARGE NOTE ADULT - PLAN OF CARE
Follow up with surgery. Do no lift heavy things or perform strenuous exercise before following up with your surgeon.

## 2017-09-03 NOTE — CONSULT NOTE ADULT - ASSESSMENT
A/P: 38 y/o F with clinical and radiographic evidence of internal hernia hemodynamically stable without active signs of sepsis  -Recommend transfer to OSH for operative management  -Discussed with attending physician on call, patient, family and attending ER physician

## 2017-09-03 NOTE — ED PROVIDER NOTE - OBJECTIVE STATEMENT
37y old with etoh abuse, presents with abd distention, acute, no tap, no fever, passing gas, has had bm, passing urine, no dysuria, has nausea, was eating dinner, states felt pain, and in one hr, pain was sharp, diffuse, writhing, no relieving factors, tolerating po well, did not take anything for pain

## 2017-09-03 NOTE — DISCHARGE NOTE ADULT - PATIENT PORTAL LINK FT
“You can access the Follow[facility] Patient Portal, offered by [facility], by registering with the following website: [email or web address]”

## 2017-09-03 NOTE — H&P ADULT - ASSESSMENT
37y year old Female who presents with abdominal pain for one day, history of Bernabe en Y gastric bypass, SBR for intussusception Hernia repair with mesh, CT suggested internal hernia, with mesentery swirling sign, and bowel edema.   - Admit to Team Green Surgery  - Pre op   - surgical consent  - urgent diagnostic laparoscopy,  - d/w Dr. Montana  - Surgical Green Team.

## 2017-09-03 NOTE — H&P ADULT - HISTORY OF PRESENT ILLNESS
36 y/o F with history of Bernabe en Y gastric bypass in 2010, SBR for intussusception presented with 1 day h/o of acute epigastric abd pain to [place], transferred to [facility] for operative management of an internal hernia. Patient had acutely worsening associated NBNB emesis. Last BM was the evening prior, was normal, formed, nonbloody. CT scan shows swirling and mesenteric edema near anastomotic site from bypass, consistent with internal hernia. Transferred to OR directly for operative repair.

## 2017-09-03 NOTE — ED PROVIDER NOTE - PHYSICAL EXAMINATION
Constitutional : Appears uncomfortably, talking in short sentences  Head :NC AT , no swelling  Eyes :eomi, no swelling  Mouth :mm dry  Neck : supple, trachea in midline  Chest :Elias air entry, symm chest expansion, no distress  Heart :S1 S2 distant  Abdomen :abd distention, diffuse tenderness, mechanical bs  Musc/Skel :ext no swelling, no deformity, no spine tenderness, distal pulses present  skin old scar tissues  Neuro  :AAO 3 no focal deficits

## 2017-09-03 NOTE — DISCHARGE NOTE ADULT - MEDICATION SUMMARY - MEDICATIONS TO TAKE
I will START or STAY ON the medications listed below when I get home from the hospital:    predniSONE 20 mg oral tablet  -- 1 tab(s) by mouth 2 times a day  -- It is very important that you take or use this exactly as directed.  Do not skip doses or discontinue unless directed by your doctor.  Obtain medical advice before taking any non-prescription drugs as some may affect the action of this medication.  Take with food or milk.    -- Indication: For Asthma    rizatriptan 10 mg oral tablet  -- 1 tab(s) by mouth once a day  -- Indication: For Migraine    methadone 10 mg oral tablet  -- 1 tab(s) by mouth every 12 hours  -- Indication: For Chronic Pain    acetaminophen-oxycodone 325 mg-5 mg oral tablet  -- 1 tab(s) by mouth every 4 hours, As needed, Moderate Pain (4 - 6) MDD:6 tabs per day  -- Indication: For Post-operative Pain    aluminum hydroxide-magnesium hydroxide 200 mg-200 mg/5 mL oral suspension  -- 30 milliliter(s) by mouth every 6 hours, As needed, Dyspepsia  -- Indication: For GERD    propranolol 20 mg oral tablet  -- 1 tab(s) by mouth 2 times a day  -- Indication: For Hypertension    gabapentin 400 mg oral capsule  -- 1 cap(s) by mouth 4 times a day  -- Indication: For Nerve Pain    venlafaxine 37.5 mg oral tablet  -- 1 tab(s) by mouth once a day  -- Indication: For Anxiety    Zofran ODT 4 mg oral tablet, disintegrating  -- 1 tab(s) by mouth 3 times a day  -- Indication: For Nausea    Zofran ODT 4 mg oral tablet, disintegrating  -- 1 tab(s) by mouth every 6 hours, As Needed  -- Indication: For Nausea    benzonatate 100 mg oral capsule  -- 1 cap(s) by mouth 2 times a day  -- May cause drowsiness.  Alcohol may intensify this effect.  Use care when operating dangerous machinery.  Swallow whole.  Do not crush.    -- Indication: For Asthma    albuterol 2.5 mg/3 mL (0.083%) inhalation solution  -- 3 milliliter(s) inhaled 3 times a day  -- For inhalation only.  It is very important that you take or use this exactly as directed.  Do not skip doses or discontinue unless directed by your doctor.  Obtain medical advice before taking any non-prescription drugs as some may affect the action of this medication.    -- Indication: For Asthma    ProAir HFA 90 mcg/inh inhalation aerosol  -- 1 puff(s) inhaled 3 times a day  -- For inhalation only.  It is very important that you take or use this exactly as directed.  Do not skip doses or discontinue unless directed by your doctor.  Obtain medical advice before taking any non-prescription drugs as some may affect the action of this medication.  Shake well before use.    -- Indication: For Asthma    Lidoderm 5% topical film  -- Apply on skin to affected area every 12 hours  -- Indication: For Pain    Carafate 1 g oral tablet  -- 1 tab(s) by mouth 4 times a day (before meals and at bedtime)  -- Do not take dairy products, antacids, or iron preparations within one hour of this medication.  It is very important that you take or use this exactly as directed.  Do not skip doses or discontinue unless directed by your doctor.  Take medication on an empty stomach 1 hour before or 2 to 3 hours after a meal unless otherwise directed by your doctor.    -- Indication: For GERD    cyclobenzaprine 10 mg oral tablet  -- 2 tab(s) by mouth once a day (at bedtime)  -- Indication: For Nerve Pain    cyclobenzaprine 10 mg oral tablet  -- 1 tab(s) by mouth 2 times a day, As needed, Muscle Spasm  -- Indication: For Muscle Spasm    pantoprazole 40 mg oral delayed release tablet  -- 1 tab(s) by mouth 2 times a day (before meals)  -- Indication: For GERD    Isis 0.35 mg oral tablet  -- 1 tab(s) by mouth once a day  -- Indication: For Contraception

## 2017-09-04 LAB
ANION GAP SERPL CALC-SCNC: 14 MMOL/L — SIGNIFICANT CHANGE UP (ref 5–17)
BUN SERPL-MCNC: 7 MG/DL — SIGNIFICANT CHANGE UP (ref 7–23)
CALCIUM SERPL-MCNC: 9.2 MG/DL — SIGNIFICANT CHANGE UP (ref 8.4–10.5)
CHLORIDE SERPL-SCNC: 103 MMOL/L — SIGNIFICANT CHANGE UP (ref 96–108)
CO2 SERPL-SCNC: 22 MMOL/L — SIGNIFICANT CHANGE UP (ref 22–31)
CREAT SERPL-MCNC: 0.61 MG/DL — SIGNIFICANT CHANGE UP (ref 0.5–1.3)
GLUCOSE SERPL-MCNC: 115 MG/DL — HIGH (ref 70–99)
HCT VFR BLD CALC: 29.5 % — LOW (ref 34.5–45)
HGB BLD-MCNC: 9 G/DL — LOW (ref 11.5–15.5)
LACTATE SERPL-SCNC: 0.6 MMOL/L — LOW (ref 0.7–2)
MAGNESIUM SERPL-MCNC: 1.9 MG/DL — SIGNIFICANT CHANGE UP (ref 1.6–2.6)
MCHC RBC-ENTMCNC: 26.7 PG — LOW (ref 27–34)
MCHC RBC-ENTMCNC: 30.5 GM/DL — LOW (ref 32–36)
MCV RBC AUTO: 87.5 FL — SIGNIFICANT CHANGE UP (ref 80–100)
PHOSPHATE SERPL-MCNC: 5.2 MG/DL — HIGH (ref 2.5–4.5)
PLATELET # BLD AUTO: 385 K/UL — SIGNIFICANT CHANGE UP (ref 150–400)
POTASSIUM SERPL-MCNC: 3.5 MMOL/L — SIGNIFICANT CHANGE UP (ref 3.5–5.3)
POTASSIUM SERPL-SCNC: 3.5 MMOL/L — SIGNIFICANT CHANGE UP (ref 3.5–5.3)
RBC # BLD: 3.37 M/UL — LOW (ref 3.8–5.2)
RBC # FLD: 16.5 % — HIGH (ref 10.3–14.5)
SODIUM SERPL-SCNC: 139 MMOL/L — SIGNIFICANT CHANGE UP (ref 135–145)
WBC # BLD: 11.24 K/UL — HIGH (ref 3.8–10.5)
WBC # FLD AUTO: 11.24 K/UL — HIGH (ref 3.8–10.5)

## 2017-09-04 PROCEDURE — 74177 CT ABD & PELVIS W/CONTRAST: CPT | Mod: 26

## 2017-09-04 PROCEDURE — 74000: CPT | Mod: 26

## 2017-09-04 RX ORDER — KETOROLAC TROMETHAMINE 30 MG/ML
15 SYRINGE (ML) INJECTION EVERY 6 HOURS
Qty: 0 | Refills: 0 | Status: DISCONTINUED | OUTPATIENT
Start: 2017-09-04 | End: 2017-09-05

## 2017-09-04 RX ORDER — POTASSIUM CHLORIDE 20 MEQ
40 PACKET (EA) ORAL ONCE
Qty: 0 | Refills: 0 | Status: COMPLETED | OUTPATIENT
Start: 2017-09-04 | End: 2017-09-04

## 2017-09-04 RX ORDER — MORPHINE SULFATE 50 MG/1
2 CAPSULE, EXTENDED RELEASE ORAL ONCE
Qty: 0 | Refills: 0 | Status: DISCONTINUED | OUTPATIENT
Start: 2017-09-04 | End: 2017-09-04

## 2017-09-04 RX ORDER — SODIUM CHLORIDE 9 MG/ML
3 INJECTION INTRAMUSCULAR; INTRAVENOUS; SUBCUTANEOUS EVERY 8 HOURS
Qty: 0 | Refills: 0 | Status: DISCONTINUED | OUTPATIENT
Start: 2017-09-04 | End: 2017-09-05

## 2017-09-04 RX ORDER — ACETAMINOPHEN 500 MG
675 TABLET ORAL ONCE
Qty: 0 | Refills: 0 | Status: DISCONTINUED | OUTPATIENT
Start: 2017-09-04 | End: 2017-09-04

## 2017-09-04 RX ORDER — POTASSIUM CHLORIDE 20 MEQ
20 PACKET (EA) ORAL
Qty: 0 | Refills: 0 | Status: DISCONTINUED | OUTPATIENT
Start: 2017-09-04 | End: 2017-09-04

## 2017-09-04 RX ORDER — MAGNESIUM SULFATE 500 MG/ML
2 VIAL (ML) INJECTION ONCE
Qty: 0 | Refills: 0 | Status: COMPLETED | OUTPATIENT
Start: 2017-09-04 | End: 2017-09-04

## 2017-09-04 RX ADMIN — METHADONE HYDROCHLORIDE 10 MILLIGRAM(S): 40 TABLET ORAL at 17:50

## 2017-09-04 RX ADMIN — OXYCODONE AND ACETAMINOPHEN 2 TABLET(S): 5; 325 TABLET ORAL at 03:58

## 2017-09-04 RX ADMIN — Medication 15 MILLIGRAM(S): at 12:46

## 2017-09-04 RX ADMIN — HEPARIN SODIUM 5000 UNIT(S): 5000 INJECTION INTRAVENOUS; SUBCUTANEOUS at 06:16

## 2017-09-04 RX ADMIN — PANTOPRAZOLE SODIUM 40 MILLIGRAM(S): 20 TABLET, DELAYED RELEASE ORAL at 06:19

## 2017-09-04 RX ADMIN — CYCLOBENZAPRINE HYDROCHLORIDE 10 MILLIGRAM(S): 10 TABLET, FILM COATED ORAL at 11:47

## 2017-09-04 RX ADMIN — HEPARIN SODIUM 5000 UNIT(S): 5000 INJECTION INTRAVENOUS; SUBCUTANEOUS at 14:09

## 2017-09-04 RX ADMIN — OXYCODONE AND ACETAMINOPHEN 2 TABLET(S): 5; 325 TABLET ORAL at 19:13

## 2017-09-04 RX ADMIN — GABAPENTIN 400 MILLIGRAM(S): 400 CAPSULE ORAL at 06:17

## 2017-09-04 RX ADMIN — SODIUM CHLORIDE 3 MILLILITER(S): 9 INJECTION INTRAMUSCULAR; INTRAVENOUS; SUBCUTANEOUS at 06:10

## 2017-09-04 RX ADMIN — Medication 15 MILLIGRAM(S): at 12:31

## 2017-09-04 RX ADMIN — MORPHINE SULFATE 2 MILLIGRAM(S): 50 CAPSULE, EXTENDED RELEASE ORAL at 03:45

## 2017-09-04 RX ADMIN — HEPARIN SODIUM 5000 UNIT(S): 5000 INJECTION INTRAVENOUS; SUBCUTANEOUS at 21:53

## 2017-09-04 RX ADMIN — GABAPENTIN 400 MILLIGRAM(S): 400 CAPSULE ORAL at 23:16

## 2017-09-04 RX ADMIN — OXYCODONE AND ACETAMINOPHEN 2 TABLET(S): 5; 325 TABLET ORAL at 18:42

## 2017-09-04 RX ADMIN — GABAPENTIN 400 MILLIGRAM(S): 400 CAPSULE ORAL at 11:48

## 2017-09-04 RX ADMIN — Medication 1 TABLET(S): at 19:13

## 2017-09-04 RX ADMIN — SODIUM CHLORIDE 3 MILLILITER(S): 9 INJECTION INTRAMUSCULAR; INTRAVENOUS; SUBCUTANEOUS at 21:51

## 2017-09-04 RX ADMIN — Medication 100 MILLIGRAM(S): at 06:16

## 2017-09-04 RX ADMIN — METHADONE HYDROCHLORIDE 10 MILLIGRAM(S): 40 TABLET ORAL at 06:16

## 2017-09-04 RX ADMIN — LIDOCAINE 1 PATCH: 4 CREAM TOPICAL at 11:45

## 2017-09-04 RX ADMIN — CYCLOBENZAPRINE HYDROCHLORIDE 20 MILLIGRAM(S): 10 TABLET, FILM COATED ORAL at 21:53

## 2017-09-04 RX ADMIN — Medication 1 TABLET(S): at 12:31

## 2017-09-04 RX ADMIN — Medication 15 MILLIGRAM(S): at 17:50

## 2017-09-04 RX ADMIN — Medication 1 TABLET(S): at 06:15

## 2017-09-04 RX ADMIN — Medication 15 MILLIGRAM(S): at 23:45

## 2017-09-04 RX ADMIN — Medication 1 TABLET(S): at 06:45

## 2017-09-04 RX ADMIN — LIDOCAINE 1 PATCH: 4 CREAM TOPICAL at 01:05

## 2017-09-04 RX ADMIN — Medication 15 MILLIGRAM(S): at 18:41

## 2017-09-04 RX ADMIN — Medication 1 TABLET(S): at 13:01

## 2017-09-04 RX ADMIN — Medication 1 TABLET(S): at 18:42

## 2017-09-04 RX ADMIN — OXYCODONE AND ACETAMINOPHEN 2 TABLET(S): 5; 325 TABLET ORAL at 14:12

## 2017-09-04 RX ADMIN — PANTOPRAZOLE SODIUM 40 MILLIGRAM(S): 20 TABLET, DELAYED RELEASE ORAL at 17:50

## 2017-09-04 RX ADMIN — OXYCODONE AND ACETAMINOPHEN 2 TABLET(S): 5; 325 TABLET ORAL at 14:45

## 2017-09-04 RX ADMIN — SODIUM CHLORIDE 3 MILLILITER(S): 9 INJECTION INTRAMUSCULAR; INTRAVENOUS; SUBCUTANEOUS at 13:26

## 2017-09-04 RX ADMIN — OXYCODONE AND ACETAMINOPHEN 2 TABLET(S): 5; 325 TABLET ORAL at 09:39

## 2017-09-04 RX ADMIN — MORPHINE SULFATE 2 MILLIGRAM(S): 50 CAPSULE, EXTENDED RELEASE ORAL at 00:30

## 2017-09-04 RX ADMIN — OXYCODONE AND ACETAMINOPHEN 2 TABLET(S): 5; 325 TABLET ORAL at 23:17

## 2017-09-04 RX ADMIN — Medication 40 MILLIEQUIVALENT(S): at 10:54

## 2017-09-04 RX ADMIN — LIDOCAINE 1 PATCH: 4 CREAM TOPICAL at 22:22

## 2017-09-04 RX ADMIN — Medication 50 GRAM(S): at 10:52

## 2017-09-04 RX ADMIN — MORPHINE SULFATE 2 MILLIGRAM(S): 50 CAPSULE, EXTENDED RELEASE ORAL at 03:06

## 2017-09-04 RX ADMIN — GABAPENTIN 400 MILLIGRAM(S): 400 CAPSULE ORAL at 17:50

## 2017-09-04 RX ADMIN — Medication 100 MILLIGRAM(S): at 17:50

## 2017-09-04 RX ADMIN — OXYCODONE AND ACETAMINOPHEN 2 TABLET(S): 5; 325 TABLET ORAL at 04:30

## 2017-09-04 RX ADMIN — Medication 15 MILLIGRAM(S): at 23:17

## 2017-09-04 RX ADMIN — OXYCODONE AND ACETAMINOPHEN 2 TABLET(S): 5; 325 TABLET ORAL at 09:09

## 2017-09-04 NOTE — PROGRESS NOTE ADULT - ATTENDING COMMENTS
Pt seen and examined  Agree with note which was reviewed and edited where appropriate.  D/W patient, RN, residents and Fellow    Unclear etiology for baseline abdominal pain, but better from repair of internal hernia.

## 2017-09-04 NOTE — PROGRESS NOTE ADULT - ASSESSMENT
ASSESSMENT: Jessica Cuba is a 37 y.o. woman POD 1 from laparoscopic reduction and closure of internal hernia. Continued pain, but tolerating small amounts of regular diet.    PLAN:  - Regular diet as tolerated  - Due to void at 0700  - Restart all home medications  - Ambulate ASSESSMENT: Jessica Cuba is a 37 y.o. woman POD 1 from laparoscopic reduction and closure of internal hernia. Continued pain, but tolerating small amounts of regular diet.    PLAN:  - Regular diet as tolerated  - Due to void at 0700; place Sy if unable   - Restart all home medications  - Ambulate  - Will need a repeat CT Abd/Pelvis prior to discharge ASSESSMENT: Jessica Cuba is a 37 y.o. woman POD 1 from laparoscopic reduction and closure of internal hernia. Continued pain, but tolerating small amounts of regular diet.    PLAN:  - Regular diet as tolerated  - Due to void at 0700; place Sy if unable   - Restart all home medications  - Ambulate  - Will need a repeat CT Abd/Pelvis prior to discharge for new baseline.

## 2017-09-04 NOTE — CHART NOTE - NSCHARTNOTEFT_GEN_A_CORE
Spoke with patient at length to reiterate treatment plan. Patient still very irritable with staff and difficult to work with. She has an extensive chronic pain medication regimen and psychiatric medications listed as home medications, but refused psychiatric medications, claiming she does not take them. She refused social work and psychiatric consult, because this would be an "insult" to her. We will continue to try to manage pain and will get a chronic pain consult.

## 2017-09-04 NOTE — PROGRESS NOTE ADULT - SUBJECTIVE AND OBJECTIVE BOX
Green Team Surgery  Post-operative Check    SUBJECTIVE: Continues to complain of some upper abdominal pain and nausea. Ate very little overnight because of associated pain. No vomiting. Was unable to void and had a catheter placed, with return of 900 mL of urine, and removed again. Due to void this morning.     OBJECTIVE:  Physical Exam:  - General: alert, interactive, agitated, standing throughout the exam and interview  - Abd: soft, moderate distention, tender to upper abdomen     ICU Vital Signs Last 24 Hrs  T(C): 36.9 (04 Sep 2017 03:56), Max: 37.1 (03 Sep 2017 23:48)  T(F): 98.4 (04 Sep 2017 03:56), Max: 98.7 (03 Sep 2017 23:48)  HR: 95 (04 Sep 2017 03:56) (83 - 108)  BP: 116/77 (04 Sep 2017 03:56) (98/77 - 130/87)  BP(mean): 97 (03 Sep 2017 12:15) (83 - 97)  ABP: --  ABP(mean): --  RR: 18 (04 Sep 2017 03:56) (16 - 20)  SpO2: 100% (04 Sep 2017 03:56) (95% - 100%)    I&O's Detail    03 Sep 2017 07:01  -  04 Sep 2017 06:43  --------------------------------------------------------  IN:    Oral Fluid: 380 mL  Total IN: 380 mL    OUT:    Ureteral Catheter: 900 mL  Total OUT: 900 mL    Total NET: -520 mL    MEDICATIONS  (STANDING):  heparin  Injectable 5000 Unit(s) SubCutaneous every 8 hours  lactated ringers. 1000 milliLiter(s) (75 mL/Hr) IV Continuous <Continuous>  sucralfate 1 Gram(s) Oral Before meals and at bedtime  propranolol 20 milliGRAM(s) Oral two times a day  ALBUTerol    90 MICROgram(s) HFA Inhaler 1 Puff(s) Inhalation three times a day  methadone    Tablet 10 milliGRAM(s) Oral two times a day  venlafaxine 37.5 milliGRAM(s) Oral daily  gabapentin 400 milliGRAM(s) Oral four times a day  SUMAtriptan 25 milliGRAM(s) Oral once  gabapentin 400 milliGRAM(s) Oral four times a day  benzonatate 100 milliGRAM(s) Oral two times a day  lidocaine   Patch 1 Patch Transdermal daily  cyclobenzaprine 20 milliGRAM(s) Oral at bedtime  pantoprazole    Tablet 40 milliGRAM(s) Oral two times a day before meals  sodium chloride 0.9% lock flush 3 milliLiter(s) IV Push every 8 hours    MEDICATIONS  (PRN):  oxyCODONE    5 mG/acetaminophen 325 mG 1 Tablet(s) Oral every 4 hours PRN Moderate Pain (4 - 6)  oxyCODONE    5 mG/acetaminophen 325 mG 2 Tablet(s) Oral every 4 hours PRN Severe Pain (7 - 10)  aluminum hydroxide/magnesium hydroxide/simethicone Suspension 30 milliLiter(s) Oral every 6 hours PRN Dyspepsia  acetaminophen 325 mG/butalbital 50 mG/caffeine 40 mG 1 Tablet(s) Oral every 6 hours PRN headache  cyclobenzaprine 10 milliGRAM(s) Oral two times a day PRN Muscle Spasm    Labs: 9/4 labs pending    Rads:  CT Abd/Pelvis (9/3): Interval change in location of the JJ anastomosis with mesenteric swirling, edema, ascites and narrowing of the SMV highly concerning for internal hernia and possible closed loop obstruction in this gastric bypass patient. Urgent surgical consultation is recommended.

## 2017-09-05 VITALS
RESPIRATION RATE: 18 BRPM | DIASTOLIC BLOOD PRESSURE: 72 MMHG | HEART RATE: 105 BPM | OXYGEN SATURATION: 99 % | SYSTOLIC BLOOD PRESSURE: 104 MMHG | TEMPERATURE: 98 F

## 2017-09-05 LAB
ANION GAP SERPL CALC-SCNC: 10 MMOL/L — SIGNIFICANT CHANGE UP (ref 5–17)
BUN SERPL-MCNC: 7 MG/DL — SIGNIFICANT CHANGE UP (ref 7–23)
CALCIUM SERPL-MCNC: 9 MG/DL — SIGNIFICANT CHANGE UP (ref 8.4–10.5)
CHLORIDE SERPL-SCNC: 108 MMOL/L — SIGNIFICANT CHANGE UP (ref 96–108)
CO2 SERPL-SCNC: 25 MMOL/L — SIGNIFICANT CHANGE UP (ref 22–31)
CREAT SERPL-MCNC: 0.53 MG/DL — SIGNIFICANT CHANGE UP (ref 0.5–1.3)
GLUCOSE SERPL-MCNC: 100 MG/DL — HIGH (ref 70–99)
HCT VFR BLD CALC: 29.3 % — LOW (ref 34.5–45)
HGB BLD-MCNC: 9.1 G/DL — LOW (ref 11.5–15.5)
MAGNESIUM SERPL-MCNC: 2 MG/DL — SIGNIFICANT CHANGE UP (ref 1.6–2.6)
MCHC RBC-ENTMCNC: 28 PG — SIGNIFICANT CHANGE UP (ref 27–34)
MCHC RBC-ENTMCNC: 31.1 GM/DL — LOW (ref 32–36)
MCV RBC AUTO: 90 FL — SIGNIFICANT CHANGE UP (ref 80–100)
PHOSPHATE SERPL-MCNC: 5 MG/DL — HIGH (ref 2.5–4.5)
PLATELET # BLD AUTO: 289 K/UL — SIGNIFICANT CHANGE UP (ref 150–400)
POTASSIUM SERPL-MCNC: 4.6 MMOL/L — SIGNIFICANT CHANGE UP (ref 3.5–5.3)
POTASSIUM SERPL-SCNC: 4.6 MMOL/L — SIGNIFICANT CHANGE UP (ref 3.5–5.3)
RBC # BLD: 3.26 M/UL — LOW (ref 3.8–5.2)
RBC # FLD: 14.8 % — HIGH (ref 10.3–14.5)
SODIUM SERPL-SCNC: 143 MMOL/L — SIGNIFICANT CHANGE UP (ref 135–145)
WBC # BLD: 8.6 K/UL — SIGNIFICANT CHANGE UP (ref 3.8–10.5)
WBC # FLD AUTO: 8.6 K/UL — SIGNIFICANT CHANGE UP (ref 3.8–10.5)

## 2017-09-05 PROCEDURE — 74018 RADEX ABDOMEN 1 VIEW: CPT

## 2017-09-05 PROCEDURE — 85027 COMPLETE CBC AUTOMATED: CPT

## 2017-09-05 PROCEDURE — 74177 CT ABD & PELVIS W/CONTRAST: CPT

## 2017-09-05 PROCEDURE — 86900 BLOOD TYPING SEROLOGIC ABO: CPT

## 2017-09-05 PROCEDURE — 86850 RBC ANTIBODY SCREEN: CPT

## 2017-09-05 PROCEDURE — 93970 EXTREMITY STUDY: CPT

## 2017-09-05 PROCEDURE — 83605 ASSAY OF LACTIC ACID: CPT

## 2017-09-05 PROCEDURE — 83735 ASSAY OF MAGNESIUM: CPT

## 2017-09-05 PROCEDURE — 80048 BASIC METABOLIC PNL TOTAL CA: CPT

## 2017-09-05 PROCEDURE — 84100 ASSAY OF PHOSPHORUS: CPT

## 2017-09-05 PROCEDURE — 86901 BLOOD TYPING SEROLOGIC RH(D): CPT

## 2017-09-05 PROCEDURE — 93970 EXTREMITY STUDY: CPT | Mod: 26

## 2017-09-05 RX ORDER — OXYCODONE HYDROCHLORIDE 5 MG/1
1 TABLET ORAL
Qty: 18 | Refills: 0 | OUTPATIENT
Start: 2017-09-05 | End: 2017-09-08

## 2017-09-05 RX ORDER — CYCLOBENZAPRINE HYDROCHLORIDE 10 MG/1
2 TABLET, FILM COATED ORAL
Qty: 0 | Refills: 0 | COMMUNITY
Start: 2017-09-05

## 2017-09-05 RX ORDER — CYCLOBENZAPRINE HYDROCHLORIDE 10 MG/1
1 TABLET, FILM COATED ORAL
Qty: 0 | Refills: 0 | COMMUNITY
Start: 2017-09-05

## 2017-09-05 RX ADMIN — HEPARIN SODIUM 5000 UNIT(S): 5000 INJECTION INTRAVENOUS; SUBCUTANEOUS at 06:17

## 2017-09-05 RX ADMIN — Medication 1 TABLET(S): at 04:00

## 2017-09-05 RX ADMIN — PANTOPRAZOLE SODIUM 40 MILLIGRAM(S): 20 TABLET, DELAYED RELEASE ORAL at 06:17

## 2017-09-05 RX ADMIN — GABAPENTIN 400 MILLIGRAM(S): 400 CAPSULE ORAL at 12:11

## 2017-09-05 RX ADMIN — OXYCODONE AND ACETAMINOPHEN 2 TABLET(S): 5; 325 TABLET ORAL at 04:00

## 2017-09-05 RX ADMIN — Medication 15 MILLIGRAM(S): at 14:12

## 2017-09-05 RX ADMIN — SODIUM CHLORIDE 3 MILLILITER(S): 9 INJECTION INTRAMUSCULAR; INTRAVENOUS; SUBCUTANEOUS at 06:12

## 2017-09-05 RX ADMIN — SODIUM CHLORIDE 3 MILLILITER(S): 9 INJECTION INTRAMUSCULAR; INTRAVENOUS; SUBCUTANEOUS at 11:55

## 2017-09-05 RX ADMIN — OXYCODONE AND ACETAMINOPHEN 2 TABLET(S): 5; 325 TABLET ORAL at 00:00

## 2017-09-05 RX ADMIN — PANTOPRAZOLE SODIUM 40 MILLIGRAM(S): 20 TABLET, DELAYED RELEASE ORAL at 17:19

## 2017-09-05 RX ADMIN — OXYCODONE AND ACETAMINOPHEN 2 TABLET(S): 5; 325 TABLET ORAL at 09:28

## 2017-09-05 RX ADMIN — METHADONE HYDROCHLORIDE 10 MILLIGRAM(S): 40 TABLET ORAL at 17:23

## 2017-09-05 RX ADMIN — Medication 15 MILLIGRAM(S): at 06:40

## 2017-09-05 RX ADMIN — Medication 100 MILLIGRAM(S): at 06:17

## 2017-09-05 RX ADMIN — GABAPENTIN 400 MILLIGRAM(S): 400 CAPSULE ORAL at 17:20

## 2017-09-05 RX ADMIN — Medication 100 MILLIGRAM(S): at 17:19

## 2017-09-05 RX ADMIN — OXYCODONE AND ACETAMINOPHEN 2 TABLET(S): 5; 325 TABLET ORAL at 03:21

## 2017-09-05 RX ADMIN — Medication 1 TABLET(S): at 09:28

## 2017-09-05 RX ADMIN — OXYCODONE AND ACETAMINOPHEN 2 TABLET(S): 5; 325 TABLET ORAL at 18:52

## 2017-09-05 RX ADMIN — OXYCODONE AND ACETAMINOPHEN 2 TABLET(S): 5; 325 TABLET ORAL at 14:11

## 2017-09-05 RX ADMIN — OXYCODONE AND ACETAMINOPHEN 2 TABLET(S): 5; 325 TABLET ORAL at 14:40

## 2017-09-05 RX ADMIN — Medication 1 TABLET(S): at 03:23

## 2017-09-05 RX ADMIN — CYCLOBENZAPRINE HYDROCHLORIDE 10 MILLIGRAM(S): 10 TABLET, FILM COATED ORAL at 06:18

## 2017-09-05 RX ADMIN — Medication 1 TABLET(S): at 18:52

## 2017-09-05 RX ADMIN — Medication 15 MILLIGRAM(S): at 12:11

## 2017-09-05 RX ADMIN — Medication 1 TABLET(S): at 10:00

## 2017-09-05 RX ADMIN — LIDOCAINE 1 PATCH: 4 CREAM TOPICAL at 12:12

## 2017-09-05 RX ADMIN — METHADONE HYDROCHLORIDE 10 MILLIGRAM(S): 40 TABLET ORAL at 06:18

## 2017-09-05 RX ADMIN — Medication 15 MILLIGRAM(S): at 06:17

## 2017-09-05 RX ADMIN — GABAPENTIN 400 MILLIGRAM(S): 400 CAPSULE ORAL at 06:17

## 2017-09-05 RX ADMIN — CYCLOBENZAPRINE HYDROCHLORIDE 10 MILLIGRAM(S): 10 TABLET, FILM COATED ORAL at 12:12

## 2017-09-05 RX ADMIN — OXYCODONE AND ACETAMINOPHEN 2 TABLET(S): 5; 325 TABLET ORAL at 10:00

## 2017-09-05 NOTE — PROVIDER CONTACT NOTE (OTHER) - BACKGROUND
9/3/17 Tx from [place] lap reduction hernia distortion of volvous, Hx DM type 2
pt s/p lap reduction hernia repair
9/3/17 Tx from [facility] lap reduction hernia distortion of volvous.
9/3/17 pt Tx from [facility] lap reduction hernia distortion of volvous
pt s/p ex lap hernia repair
pt s/p lap reduction hernia distortion of volvous

## 2017-09-05 NOTE — PROVIDER CONTACT NOTE (OTHER) - ASSESSMENT
pt straight cath overnight 900cc of urine out @2300. pt DTV 0700, pt encouraged to drink fluids. bladder scan revealed >999 cc of urine.
during straight cath of pt, pt screamed in pain, c/o spasms. pt has received percocet and flexeril for pain previously
pt DTV 1830, pt states she does not feel any bladder discomfort , pt has tried to void multiple times with no success. bladder scan revealed >999cc of urine.
pt refusing to have IV fluids, pt states she is drinking water, water bottles at bedside, pt encouraged to drink
pt c/o white discharge from vagina and eason insertion site. RN to inspect and clean area. pt educated on importance of keeping site clean. pt verbalized understanding.
pt states she is diabetic and tests blood sugar at home, controls DM with diet.

## 2017-09-05 NOTE — PROGRESS NOTE ADULT - ASSESSMENT
ASSESSMENT: Jessica Cuba is a 37 y.o. woman POD 2 from laparoscopic reduction and closure of internal hernia. Continued pain, but tolerating small amounts of regular diet.    PLAN:  - Regular diet as tolerated  - Sy in place   - Ambulate      [facility], [id number] ASSESSMENT: Jessica Cuba is a 37 y.o. woman POD 2 from laparoscopic reduction and closure of internal hernia. Continued pain, but tolerating small amounts of regular diet.    PLAN:  - Regular diet when tolerated  - Sy in place, to be removed post diuresis.   - Ambulate      [facility], [id number]

## 2017-09-05 NOTE — PROGRESS NOTE ADULT - ATTENDING COMMENTS
Pt seen and examined, results of CT discussed with her and .  Agree with note which was reviewed and edited where appropriate.  D/W patient, RN, residents and Fellow

## 2017-09-05 NOTE — PROGRESS NOTE ADULT - SUBJECTIVE AND OBJECTIVE BOX
GREEN SURGERY PROGRESS NOTE    POST OPERATIVE DAY #: 2      SUBJECTIVE: Pt seen bedside. AVSS. Difficult to manage overnight         Vital Signs Last 24 Hrs  T(C): 36.4 (05 Sep 2017 04:25), Max: 37 (04 Sep 2017 08:47)  T(F): 97.6 (05 Sep 2017 04:25), Max: 98.6 (04 Sep 2017 08:47)  HR: 84 (05 Sep 2017 04:25) (68 - 118)  BP: 108/70 (05 Sep 2017 04:25) (102/67 - 118/71)  BP(mean): --  RR: 18 (05 Sep 2017 04:25) (16 - 18)  SpO2: 99% (05 Sep 2017 04:25) (98% - 100%)    Physical Exam  General: awake, alert,    Pulm: respirations unlabored, no increased WOB  Abdomen: soft, mildly distended, NT, incision c/d/i  Extremities: Grossly symmetric    I&O's Summary    03 Sep 2017 07:01  -  04 Sep 2017 07:00  --------------------------------------------------------  IN: 380 mL / OUT: 900 mL / NET: -520 mL    04 Sep 2017 07:01  -  05 Sep 2017 06:00  --------------------------------------------------------  IN: 2260 mL / OUT: 3160 mL / NET: -900 mL      I&O's Detail    03 Sep 2017 07:01  -  04 Sep 2017 07:00  --------------------------------------------------------  IN:    Oral Fluid: 380 mL  Total IN: 380 mL    OUT:    Ureteral Catheter: 900 mL  Total OUT: 900 mL    Total NET: -520 mL      04 Sep 2017 07:01  -  05 Sep 2017 06:00  --------------------------------------------------------  IN:    Oral Fluid: 2260 mL  Total IN: 2260 mL    OUT:    Ureteral Catheter: 3160 mL  Total OUT: 3160 mL    Total NET: -900 mL          MEDICATIONS  (STANDING):  heparin  Injectable 5000 Unit(s) SubCutaneous every 8 hours  lactated ringers. 1000 milliLiter(s) (75 mL/Hr) IV Continuous <Continuous>  sucralfate 1 Gram(s) Oral Before meals and at bedtime  propranolol 20 milliGRAM(s) Oral two times a day  ALBUTerol    90 MICROgram(s) HFA Inhaler 1 Puff(s) Inhalation three times a day  methadone    Tablet 10 milliGRAM(s) Oral two times a day  venlafaxine 37.5 milliGRAM(s) Oral daily  SUMAtriptan 25 milliGRAM(s) Oral once  gabapentin 400 milliGRAM(s) Oral four times a day  benzonatate 100 milliGRAM(s) Oral two times a day  lidocaine   Patch 1 Patch Transdermal daily  cyclobenzaprine 20 milliGRAM(s) Oral at bedtime  pantoprazole    Tablet 40 milliGRAM(s) Oral two times a day before meals  sodium chloride 0.9% lock flush 3 milliLiter(s) IV Push every 8 hours  ketorolac   Injectable 15 milliGRAM(s) IV Push every 6 hours    MEDICATIONS  (PRN):  oxyCODONE    5 mG/acetaminophen 325 mG 1 Tablet(s) Oral every 4 hours PRN Moderate Pain (4 - 6)  oxyCODONE    5 mG/acetaminophen 325 mG 2 Tablet(s) Oral every 4 hours PRN Severe Pain (7 - 10)  aluminum hydroxide/magnesium hydroxide/simethicone Suspension 30 milliLiter(s) Oral every 6 hours PRN Dyspepsia  acetaminophen 325 mG/butalbital 50 mG/caffeine 40 mG 1 Tablet(s) Oral every 6 hours PRN headache  cyclobenzaprine 10 milliGRAM(s) Oral two times a day PRN Muscle Spasm      LABS:                        9.0    11.24 )-----------( 385      ( 04 Sep 2017 08:06 )             29.5     09-04    139  |  103  |  7   ----------------------------<  115<H>  3.5   |  22  |  0.61    Ca    9.2      04 Sep 2017 08:18  Phos  5.2     09-04  Mg     1.9     09-04            RADIOLOGY & ADDITIONAL STUDIES: GREEN SURGERY PROGRESS NOTE    POST OPERATIVE DAY #: 2      SUBJECTIVE: Pt seen bedside. AVSS. Difficult to manage overnight. Denies passing any flatus. Continues to tolerate PO intake.     Vital Signs Last 24 Hrs  T(C): 36.4 (05 Sep 2017 04:25), Max: 37 (04 Sep 2017 08:47)  T(F): 97.6 (05 Sep 2017 04:25), Max: 98.6 (04 Sep 2017 08:47)  HR: 84 (05 Sep 2017 04:25) (68 - 118)  BP: 108/70 (05 Sep 2017 04:25) (102/67 - 118/71)  BP(mean): --  RR: 18 (05 Sep 2017 04:25) (16 - 18)  SpO2: 99% (05 Sep 2017 04:25) (98% - 100%)    Physical Exam  General: awake, alert,    Pulm: respirations unlabored, no increased WOB  Abdomen: soft, mildly distended, NT, incision c/d/i  Extremities: Grossly symmetric    I&O's Summary    03 Sep 2017 07:01  -  04 Sep 2017 07:00  --------------------------------------------------------  IN: 380 mL / OUT: 900 mL / NET: -520 mL    04 Sep 2017 07:01  -  05 Sep 2017 06:00  --------------------------------------------------------  IN: 2260 mL / OUT: 3160 mL / NET: -900 mL      I&O's Detail    03 Sep 2017 07:01  -  04 Sep 2017 07:00  --------------------------------------------------------  IN:    Oral Fluid: 380 mL  Total IN: 380 mL    OUT:    Ureteral Catheter: 900 mL  Total OUT: 900 mL    Total NET: -520 mL      04 Sep 2017 07:01  -  05 Sep 2017 06:00  --------------------------------------------------------  IN:    Oral Fluid: 2260 mL  Total IN: 2260 mL    OUT:    Ureteral Catheter: 3160 mL  Total OUT: 3160 mL    Total NET: -900 mL          MEDICATIONS  (STANDING):  heparin  Injectable 5000 Unit(s) SubCutaneous every 8 hours  lactated ringers. 1000 milliLiter(s) (75 mL/Hr) IV Continuous <Continuous>  sucralfate 1 Gram(s) Oral Before meals and at bedtime  propranolol 20 milliGRAM(s) Oral two times a day  ALBUTerol    90 MICROgram(s) HFA Inhaler 1 Puff(s) Inhalation three times a day  methadone    Tablet 10 milliGRAM(s) Oral two times a day  venlafaxine 37.5 milliGRAM(s) Oral daily  SUMAtriptan 25 milliGRAM(s) Oral once  gabapentin 400 milliGRAM(s) Oral four times a day  benzonatate 100 milliGRAM(s) Oral two times a day  lidocaine   Patch 1 Patch Transdermal daily  cyclobenzaprine 20 milliGRAM(s) Oral at bedtime  pantoprazole    Tablet 40 milliGRAM(s) Oral two times a day before meals  sodium chloride 0.9% lock flush 3 milliLiter(s) IV Push every 8 hours  ketorolac   Injectable 15 milliGRAM(s) IV Push every 6 hours    MEDICATIONS  (PRN):  oxyCODONE    5 mG/acetaminophen 325 mG 1 Tablet(s) Oral every 4 hours PRN Moderate Pain (4 - 6)  oxyCODONE    5 mG/acetaminophen 325 mG 2 Tablet(s) Oral every 4 hours PRN Severe Pain (7 - 10)  aluminum hydroxide/magnesium hydroxide/simethicone Suspension 30 milliLiter(s) Oral every 6 hours PRN Dyspepsia  acetaminophen 325 mG/butalbital 50 mG/caffeine 40 mG 1 Tablet(s) Oral every 6 hours PRN headache  cyclobenzaprine 10 milliGRAM(s) Oral two times a day PRN Muscle Spasm      LABS:                        9.0    11.24 )-----------( 385      ( 04 Sep 2017 08:06 )             29.5     09-04    139  |  103  |  7   ----------------------------<  115<H>  3.5   |  22  |  0.61    Ca    9.2      04 Sep 2017 08:18  Phos  5.2     09-04  Mg     1.9     09-04            RADIOLOGY & ADDITIONAL STUDIES: GREEN SURGERY PROGRESS NOTE    POST OPERATIVE DAY #: 2      SUBJECTIVE: Pt seen bedside. AVSS. Difficult to manage overnight. Denies passing any flatus. Continues to tolerate PO intake.     Vital Signs Last 24 Hrs  T(C): 36.4 (05 Sep 2017 04:25), Max: 37 (04 Sep 2017 08:47)  T(F): 97.6 (05 Sep 2017 04:25), Max: 98.6 (04 Sep 2017 08:47)  HR: 84 (05 Sep 2017 04:25) (68 - 118)  BP: 108/70 (05 Sep 2017 04:25) (102/67 - 118/71)  BP(mean): --  RR: 18 (05 Sep 2017 04:25) (16 - 18)  SpO2: 99% (05 Sep 2017 04:25) (98% - 100%)    Physical Exam  General: awake, alert,    Pulm: respirations unlabored, no increased WOB  Abdomen: soft, mildly distended, NT, incision c/d/i  Extremities: Grossly symmetric, mild edema    I&O's Summary    03 Sep 2017 07:01  -  04 Sep 2017 07:00  --------------------------------------------------------  IN: 380 mL / OUT: 900 mL / NET: -520 mL    04 Sep 2017 07:01  -  05 Sep 2017 06:00  --------------------------------------------------------  IN: 2260 mL / OUT: 3160 mL / NET: -900 mL      I&O's Detail    03 Sep 2017 07:01  -  04 Sep 2017 07:00  --------------------------------------------------------  IN:    Oral Fluid: 380 mL  Total IN: 380 mL    OUT:    Ureteral Catheter: 900 mL  Total OUT: 900 mL    Total NET: -520 mL      04 Sep 2017 07:01  -  05 Sep 2017 06:00  --------------------------------------------------------  IN:    Oral Fluid: 2260 mL  Total IN: 2260 mL    OUT:    Ureteral Catheter: 3160 mL  Total OUT: 3160 mL    Total NET: -900 mL          MEDICATIONS  (STANDING):  heparin  Injectable 5000 Unit(s) SubCutaneous every 8 hours  lactated ringers. 1000 milliLiter(s) (75 mL/Hr) IV Continuous <Continuous>  sucralfate 1 Gram(s) Oral Before meals and at bedtime  propranolol 20 milliGRAM(s) Oral two times a day  ALBUTerol    90 MICROgram(s) HFA Inhaler 1 Puff(s) Inhalation three times a day  methadone    Tablet 10 milliGRAM(s) Oral two times a day  venlafaxine 37.5 milliGRAM(s) Oral daily  SUMAtriptan 25 milliGRAM(s) Oral once  gabapentin 400 milliGRAM(s) Oral four times a day  benzonatate 100 milliGRAM(s) Oral two times a day  lidocaine   Patch 1 Patch Transdermal daily  cyclobenzaprine 20 milliGRAM(s) Oral at bedtime  pantoprazole    Tablet 40 milliGRAM(s) Oral two times a day before meals  sodium chloride 0.9% lock flush 3 milliLiter(s) IV Push every 8 hours  ketorolac   Injectable 15 milliGRAM(s) IV Push every 6 hours    MEDICATIONS  (PRN):  oxyCODONE    5 mG/acetaminophen 325 mG 1 Tablet(s) Oral every 4 hours PRN Moderate Pain (4 - 6)  oxyCODONE    5 mG/acetaminophen 325 mG 2 Tablet(s) Oral every 4 hours PRN Severe Pain (7 - 10)  aluminum hydroxide/magnesium hydroxide/simethicone Suspension 30 milliLiter(s) Oral every 6 hours PRN Dyspepsia  acetaminophen 325 mG/butalbital 50 mG/caffeine 40 mG 1 Tablet(s) Oral every 6 hours PRN headache  cyclobenzaprine 10 milliGRAM(s) Oral two times a day PRN Muscle Spasm      LABS:                        9.0    11.24 )-----------( 385      ( 04 Sep 2017 08:06 )             29.5     09-04    139  |  103  |  7   ----------------------------<  115<H>  3.5   |  22  |  0.61    Ca    9.2      04 Sep 2017 08:18  Phos  5.2     09-04  Mg     1.9     09-04            RADIOLOGY & ADDITIONAL STUDIES:  CT shows resolution of internal hernia and mesenteric edema, and ascites.  New image shows normal post gastric bypass anatomy with carrie in LUQ.

## 2017-09-05 NOTE — PROVIDER CONTACT NOTE (OTHER) - SITUATION
pt c/o bladder spasms/pain during straight cath
pt c/o discharge from vagina and eason insertion site
pt DTV but unable to void
pt states she is diabetic and tests blood sugar at home
pt DTV but has not voided yet
pt refusing IV fluids

## 2017-09-05 NOTE — PROVIDER CONTACT NOTE (OTHER) - REASON
pt DTV but unable to void
pt c/o bladder spasms/pain during straight cath
pt states she is diabetic and tests her blood sugar at home
pt DTV but has not voided yet
pt c/o discharge from vagina and eason insertion site
pt refusing IV fluids

## 2017-09-05 NOTE — PROVIDER CONTACT NOTE (OTHER) - ACTION/TREATMENT ORDERED:
MD Mejía notified, morphine IVP 2mg to be ordered and administered. safety maintained. call bell in reach. will continue to montior.
MD Mejía notified, ok to IVL pt. safety maintained. will continue to monitor
MD Cheatham notified. no other interventions at this time. will continue to monitor.
MD Daniel notified. eason catheter to be ordered and place. safety maintained. call bell in reach. will continue to monitor
MD Mejía notified, order to test blood sugar once. safety maintained. call bell in reach. will continue to monitor.
MD Mejía notified, straight cath to be completed. safety maintained. call bell in reach.

## 2017-11-16 ENCOUNTER — TRANSCRIPTION ENCOUNTER (OUTPATIENT)
Age: 37
End: 2017-11-16

## 2017-12-04 ENCOUNTER — EMERGENCY (EMERGENCY)
Facility: HOSPITAL | Age: 37
LOS: 1 days | Discharge: LEFT BEFORE DISCHARGE | End: 2017-12-04
Attending: EMERGENCY MEDICINE | Admitting: EMERGENCY MEDICINE
Payer: MEDICAID

## 2017-12-04 VITALS
WEIGHT: 125 LBS | OXYGEN SATURATION: 99 % | RESPIRATION RATE: 18 BRPM | SYSTOLIC BLOOD PRESSURE: 118 MMHG | HEIGHT: 65 IN | DIASTOLIC BLOOD PRESSURE: 80 MMHG | HEART RATE: 99 BPM

## 2017-12-04 VITALS
RESPIRATION RATE: 18 BRPM | OXYGEN SATURATION: 98 % | DIASTOLIC BLOOD PRESSURE: 78 MMHG | SYSTOLIC BLOOD PRESSURE: 116 MMHG | HEART RATE: 91 BPM

## 2017-12-04 DIAGNOSIS — Z98.82 BREAST IMPLANT STATUS: Chronic | ICD-10-CM

## 2017-12-04 DIAGNOSIS — M16.9 OSTEOARTHRITIS OF HIP, UNSPECIFIED: Chronic | ICD-10-CM

## 2017-12-04 DIAGNOSIS — Z98.84 BARIATRIC SURGERY STATUS: Chronic | ICD-10-CM

## 2017-12-04 LAB
ALBUMIN SERPL ELPH-MCNC: 4.4 G/DL — SIGNIFICANT CHANGE UP (ref 3.3–5.2)
ALP SERPL-CCNC: 50 U/L — SIGNIFICANT CHANGE UP (ref 40–120)
ALT FLD-CCNC: 14 U/L — SIGNIFICANT CHANGE UP
ANION GAP SERPL CALC-SCNC: 16 MMOL/L — SIGNIFICANT CHANGE UP (ref 5–17)
APTT BLD: 29.7 SEC — SIGNIFICANT CHANGE UP (ref 27.5–37.4)
AST SERPL-CCNC: 21 U/L — SIGNIFICANT CHANGE UP
BASOPHILS # BLD AUTO: 0 K/UL — SIGNIFICANT CHANGE UP (ref 0–0.2)
BASOPHILS NFR BLD AUTO: 0.1 % — SIGNIFICANT CHANGE UP (ref 0–2)
BILIRUB SERPL-MCNC: 0.1 MG/DL — LOW (ref 0.4–2)
BLD GP AB SCN SERPL QL: SIGNIFICANT CHANGE UP
BUN SERPL-MCNC: 9 MG/DL — SIGNIFICANT CHANGE UP (ref 8–20)
CALCIUM SERPL-MCNC: 9.2 MG/DL — SIGNIFICANT CHANGE UP (ref 8.6–10.2)
CHLORIDE SERPL-SCNC: 102 MMOL/L — SIGNIFICANT CHANGE UP (ref 98–107)
CO2 SERPL-SCNC: 22 MMOL/L — SIGNIFICANT CHANGE UP (ref 22–29)
CREAT SERPL-MCNC: 0.42 MG/DL — LOW (ref 0.5–1.3)
EOSINOPHIL # BLD AUTO: 0 K/UL — SIGNIFICANT CHANGE UP (ref 0–0.5)
EOSINOPHIL NFR BLD AUTO: 0.5 % — SIGNIFICANT CHANGE UP (ref 0–6)
GLUCOSE SERPL-MCNC: 32 MG/DL — CRITICAL LOW (ref 70–115)
HCT VFR BLD CALC: 35.5 % — LOW (ref 37–47)
HGB BLD-MCNC: 11.5 G/DL — LOW (ref 12–16)
INR BLD: 1 RATIO — SIGNIFICANT CHANGE UP (ref 0.88–1.16)
LACTATE BLDV-MCNC: 1.6 MMOL/L — SIGNIFICANT CHANGE UP (ref 0.5–2)
LYMPHOCYTES # BLD AUTO: 2.1 K/UL — SIGNIFICANT CHANGE UP (ref 1–4.8)
LYMPHOCYTES # BLD AUTO: 20.4 % — SIGNIFICANT CHANGE UP (ref 20–55)
MCHC RBC-ENTMCNC: 28.4 PG — SIGNIFICANT CHANGE UP (ref 27–31)
MCHC RBC-ENTMCNC: 32.4 G/DL — SIGNIFICANT CHANGE UP (ref 32–36)
MCV RBC AUTO: 87.7 FL — SIGNIFICANT CHANGE UP (ref 81–99)
MONOCYTES # BLD AUTO: 1.4 K/UL — HIGH (ref 0–0.8)
MONOCYTES NFR BLD AUTO: 13.5 % — HIGH (ref 3–10)
NEUTROPHILS # BLD AUTO: 6.8 K/UL — SIGNIFICANT CHANGE UP (ref 1.8–8)
NEUTROPHILS NFR BLD AUTO: 65.3 % — SIGNIFICANT CHANGE UP (ref 37–73)
PLATELET # BLD AUTO: 342 K/UL — SIGNIFICANT CHANGE UP (ref 150–400)
POTASSIUM SERPL-MCNC: 3.6 MMOL/L — SIGNIFICANT CHANGE UP (ref 3.5–5.3)
POTASSIUM SERPL-SCNC: 3.6 MMOL/L — SIGNIFICANT CHANGE UP (ref 3.5–5.3)
PROT SERPL-MCNC: 7.4 G/DL — SIGNIFICANT CHANGE UP (ref 6.6–8.7)
PROTHROM AB SERPL-ACNC: 11 SEC — SIGNIFICANT CHANGE UP (ref 9.8–12.7)
RBC # BLD: 4.05 M/UL — LOW (ref 4.4–5.2)
RBC # FLD: 18.7 % — HIGH (ref 11–15.6)
SODIUM SERPL-SCNC: 140 MMOL/L — SIGNIFICANT CHANGE UP (ref 135–145)
TYPE + AB SCN PNL BLD: SIGNIFICANT CHANGE UP
WBC # BLD: 10.4 K/UL — SIGNIFICANT CHANGE UP (ref 4.8–10.8)
WBC # FLD AUTO: 10.4 K/UL — SIGNIFICANT CHANGE UP (ref 4.8–10.8)

## 2017-12-04 PROCEDURE — 36415 COLL VENOUS BLD VENIPUNCTURE: CPT

## 2017-12-04 PROCEDURE — 99284 EMERGENCY DEPT VISIT MOD MDM: CPT | Mod: 25

## 2017-12-04 PROCEDURE — 85027 COMPLETE CBC AUTOMATED: CPT

## 2017-12-04 PROCEDURE — 74020: CPT

## 2017-12-04 PROCEDURE — 82962 GLUCOSE BLOOD TEST: CPT

## 2017-12-04 PROCEDURE — 99285 EMERGENCY DEPT VISIT HI MDM: CPT

## 2017-12-04 PROCEDURE — 76801 OB US < 14 WKS SINGLE FETUS: CPT | Mod: 26

## 2017-12-04 PROCEDURE — 76817 TRANSVAGINAL US OBSTETRIC: CPT

## 2017-12-04 PROCEDURE — 93005 ELECTROCARDIOGRAM TRACING: CPT

## 2017-12-04 PROCEDURE — 83605 ASSAY OF LACTIC ACID: CPT

## 2017-12-04 PROCEDURE — 85610 PROTHROMBIN TIME: CPT

## 2017-12-04 PROCEDURE — 86850 RBC ANTIBODY SCREEN: CPT

## 2017-12-04 PROCEDURE — 86901 BLOOD TYPING SEROLOGIC RH(D): CPT

## 2017-12-04 PROCEDURE — 96376 TX/PRO/DX INJ SAME DRUG ADON: CPT

## 2017-12-04 PROCEDURE — 86900 BLOOD TYPING SEROLOGIC ABO: CPT

## 2017-12-04 PROCEDURE — 76815 OB US LIMITED FETUS(S): CPT

## 2017-12-04 PROCEDURE — 93010 ELECTROCARDIOGRAM REPORT: CPT

## 2017-12-04 PROCEDURE — 80053 COMPREHEN METABOLIC PANEL: CPT

## 2017-12-04 PROCEDURE — 76815 OB US LIMITED FETUS(S): CPT | Mod: 26

## 2017-12-04 PROCEDURE — 96374 THER/PROPH/DIAG INJ IV PUSH: CPT

## 2017-12-04 PROCEDURE — 85730 THROMBOPLASTIN TIME PARTIAL: CPT

## 2017-12-04 PROCEDURE — 76817 TRANSVAGINAL US OBSTETRIC: CPT | Mod: 26

## 2017-12-04 PROCEDURE — 74020: CPT | Mod: 26

## 2017-12-04 PROCEDURE — 83690 ASSAY OF LIPASE: CPT

## 2017-12-04 PROCEDURE — 76801 OB US < 14 WKS SINGLE FETUS: CPT

## 2017-12-04 RX ORDER — SODIUM CHLORIDE 9 MG/ML
1000 INJECTION INTRAMUSCULAR; INTRAVENOUS; SUBCUTANEOUS ONCE
Qty: 0 | Refills: 0 | Status: COMPLETED | OUTPATIENT
Start: 2017-12-04 | End: 2017-12-04

## 2017-12-04 RX ORDER — ONDANSETRON 8 MG/1
4 TABLET, FILM COATED ORAL ONCE
Qty: 0 | Refills: 0 | Status: COMPLETED | OUTPATIENT
Start: 2017-12-04 | End: 2017-12-04

## 2017-12-04 RX ORDER — ACETAMINOPHEN 500 MG
650 TABLET ORAL ONCE
Qty: 0 | Refills: 0 | Status: COMPLETED | OUTPATIENT
Start: 2017-12-04 | End: 2017-12-04

## 2017-12-04 RX ORDER — SODIUM CHLORIDE 9 MG/ML
1000 INJECTION, SOLUTION INTRAVENOUS
Qty: 0 | Refills: 0 | Status: DISCONTINUED | OUTPATIENT
Start: 2017-12-04 | End: 2017-12-08

## 2017-12-04 RX ADMIN — ONDANSETRON 4 MILLIGRAM(S): 8 TABLET, FILM COATED ORAL at 11:58

## 2017-12-04 RX ADMIN — SODIUM CHLORIDE 100 MILLILITER(S): 9 INJECTION, SOLUTION INTRAVENOUS at 14:07

## 2017-12-04 RX ADMIN — Medication 650 MILLIGRAM(S): at 15:07

## 2017-12-04 RX ADMIN — SODIUM CHLORIDE 1000 MILLILITER(S): 9 INJECTION INTRAMUSCULAR; INTRAVENOUS; SUBCUTANEOUS at 11:59

## 2017-12-04 RX ADMIN — Medication 650 MILLIGRAM(S): at 14:11

## 2017-12-04 RX ADMIN — ONDANSETRON 4 MILLIGRAM(S): 8 TABLET, FILM COATED ORAL at 14:11

## 2017-12-04 NOTE — ED ADULT TRIAGE NOTE - CHIEF COMPLAINT QUOTE
Patient BIBA to ED today after having an episodes of lightheadedness, dizziness, then syncope, and now abdominal pain, headache, and more dry heaving.  Patient states she was at glucose testing after being recently being found she is pregnant and she feels her sugar is low and that's what is causing her symptoms.  Patient refusing oral temp and is awake and alert x4.

## 2017-12-04 NOTE — ED PROVIDER NOTE - OBJECTIVE STATEMENT
36 y/o female that is currently ~10 weeks pregnant presents to ED complaining of abdominal pain x a few hours. Pt states she was scheduled to receive a 3hr glucose tolerance test this AM. After drinking the drink given prior to test, pt began to feel dizzy and clammy. Also, states she began having crampy abdominal pain and a migraine. When pt got up to alert the staff that she was not feeling well, she passed out. Pt states her blood sugar was 206 in the ambulance on her way to the hospital. At this time, pt states she has severe crampy abdominal pain and spasms primarily in the LLQ, and is nauseous.

## 2017-12-04 NOTE — ED ADULT NURSE REASSESSMENT NOTE - NS ED NURSE REASSESS COMMENT FT1
PT c/o headache and nausea, MD made aware, pt medicated as per MD orders. Pt waiting for MRI and Ultrasound, aware of plan of care

## 2017-12-04 NOTE — ED ADULT NURSE NOTE - OBJECTIVE STATEMENT
PT BIBA s/p an episode of dizziness and lightheadedness followed by syncope. Pt states she is 9 weeks pregnant and was having a glucose test done. Pt states she controls sugar by diet only. Pt states she had 2 miscarriages in the past, pt also states pregnancy was not a planned pregnancy. Pt states her stomach spasms and becomes very hard then after spasm passes she feels her stomach returns to "normal" pt states her BS was 298 @09:45. Pt is A & Ox3, respirations are even & unlabored. Pt being evaluated by MD.

## 2017-12-04 NOTE — ED PROVIDER NOTE - PROGRESS NOTE DETAILS
Patient is recently known to be 10 wks pregnant; with abd pain, bloating, prior obstructions from gastric bypass; refusing CT, and MRI not a candidate, radiology deemed unfit due to peircings and tattoos that cannot be explained to be safe reliably; will need plain xray, patient consensts, medically necessary to obtain some imaging to further eval; patient consents, written consent in chart [place]: received sign out from Dr Lauren: patient awaiting MRI abdomen but radiology declines to perform, due to tattoos and body permanent body peircings declines to perform MRI; patient accepting only plain films, no ct; discussed with Dr Banerjee at [place], bariatric surgery, amenable to have patient transferred to [facility] ED and he will assess and get MRI there; this option was recomennded to patient but she is declining transfer; she prefers to leave and see if her symptoms improve on their own; she will be signing out AMA

## 2017-12-04 NOTE — ED PROVIDER NOTE - CARE PLAN
Principal Discharge DX:	Bariatric surgery status affecting pregnancy in first trimester  Secondary Diagnosis:	Abdominal pain during pregnancy in first trimester

## 2017-12-06 ENCOUNTER — ASOB RESULT (OUTPATIENT)
Age: 37
End: 2017-12-06

## 2017-12-06 ENCOUNTER — APPOINTMENT (OUTPATIENT)
Dept: ANTEPARTUM | Facility: CLINIC | Age: 37
End: 2017-12-06
Payer: MEDICAID

## 2017-12-06 ENCOUNTER — APPOINTMENT (OUTPATIENT)
Dept: MATERNAL FETAL MEDICINE | Facility: CLINIC | Age: 37
End: 2017-12-06
Payer: MEDICAID

## 2017-12-06 DIAGNOSIS — Z98.890 OTHER SPECIFIED POSTPROCEDURAL STATES: ICD-10-CM

## 2017-12-06 DIAGNOSIS — Z80.49 FAMILY HISTORY OF MALIGNANT NEOPLASM OF OTHER GENITAL ORGANS: ICD-10-CM

## 2017-12-06 DIAGNOSIS — Z80.1 FAMILY HISTORY OF MALIGNANT NEOPLASM OF TRACHEA, BRONCHUS AND LUNG: ICD-10-CM

## 2017-12-06 DIAGNOSIS — Z80.41 FAMILY HISTORY OF MALIGNANT NEOPLASM OF OVARY: ICD-10-CM

## 2017-12-06 DIAGNOSIS — F17.200 NICOTINE DEPENDENCE, UNSPECIFIED, UNCOMPLICATED: ICD-10-CM

## 2017-12-06 DIAGNOSIS — Z80.0 FAMILY HISTORY OF MALIGNANT NEOPLASM OF DIGESTIVE ORGANS: ICD-10-CM

## 2017-12-06 DIAGNOSIS — Z80.8 FAMILY HISTORY OF MALIGNANT NEOPLASM OF OTHER ORGANS OR SYSTEMS: ICD-10-CM

## 2017-12-06 PROCEDURE — 76801 OB US < 14 WKS SINGLE FETUS: CPT

## 2017-12-06 PROCEDURE — 99212 OFFICE O/P EST SF 10 MIN: CPT

## 2017-12-26 ENCOUNTER — APPOINTMENT (OUTPATIENT)
Dept: ANTEPARTUM | Facility: CLINIC | Age: 37
End: 2017-12-26
Payer: COMMERCIAL

## 2017-12-26 ENCOUNTER — APPOINTMENT (OUTPATIENT)
Dept: MATERNAL FETAL MEDICINE | Facility: CLINIC | Age: 37
End: 2017-12-26
Payer: COMMERCIAL

## 2017-12-26 ENCOUNTER — ASOB RESULT (OUTPATIENT)
Age: 37
End: 2017-12-26

## 2017-12-26 VITALS
WEIGHT: 126.44 LBS | SYSTOLIC BLOOD PRESSURE: 100 MMHG | HEIGHT: 65 IN | DIASTOLIC BLOOD PRESSURE: 60 MMHG | BODY MASS INDEX: 21.07 KG/M2 | RESPIRATION RATE: 18 BRPM | HEART RATE: 87 BPM

## 2017-12-26 PROCEDURE — 76813 OB US NUCHAL MEAS 1 GEST: CPT

## 2017-12-26 PROCEDURE — 76801 OB US < 14 WKS SINGLE FETUS: CPT

## 2017-12-26 PROCEDURE — ZZZZZ: CPT

## 2017-12-26 PROCEDURE — 36416 COLLJ CAPILLARY BLOOD SPEC: CPT

## 2017-12-26 RX ORDER — CYCLOBENZAPRINE HYDROCHLORIDE 10 MG/1
10 TABLET, FILM COATED ORAL TWICE DAILY
Refills: 0 | Status: DISCONTINUED | COMMUNITY
Start: 2017-12-06 | End: 2017-12-26

## 2017-12-26 RX ORDER — BUTALBITAL, ACETAMINOPHEN, CAFFEINE AND CODEINE PHOSPHATE 50; 325; 40; 30 MG/1; MG/1; MG/1; MG/1
50-325-40-30 CAPSULE ORAL
Refills: 0 | Status: DISCONTINUED | COMMUNITY
Start: 2017-12-06 | End: 2017-12-26

## 2018-01-03 LAB
1ST TRIMESTER DATA: NORMAL
ADDENDUM DOC: NORMAL
AFP PNL SERPL: NORMAL
AFP SERPL-ACNC: NORMAL
CLINICAL BIOCHEMIST REVIEW: NORMAL
FREE BETA HCG 1ST TRIMESTER: NORMAL
Lab: NORMAL
NASAL BONE: PRESENT
NOTES NTD: NORMAL
NT: NORMAL
PAPP-A SERPL-ACNC: NORMAL
TRISOMY 18/3: NORMAL

## 2018-02-13 ENCOUNTER — APPOINTMENT (OUTPATIENT)
Dept: MATERNAL FETAL MEDICINE | Facility: CLINIC | Age: 38
End: 2018-02-13
Payer: COMMERCIAL

## 2018-02-13 ENCOUNTER — APPOINTMENT (OUTPATIENT)
Dept: ANTEPARTUM | Facility: CLINIC | Age: 38
End: 2018-02-13
Payer: COMMERCIAL

## 2018-02-13 ENCOUNTER — ASOB RESULT (OUTPATIENT)
Age: 38
End: 2018-02-13

## 2018-02-13 VITALS
BODY MASS INDEX: 23.32 KG/M2 | SYSTOLIC BLOOD PRESSURE: 106 MMHG | WEIGHT: 140 LBS | HEART RATE: 70 BPM | OXYGEN SATURATION: 99 % | HEIGHT: 65 IN | DIASTOLIC BLOOD PRESSURE: 64 MMHG

## 2018-02-13 PROCEDURE — 76811 OB US DETAILED SNGL FETUS: CPT

## 2018-02-13 PROCEDURE — 76817 TRANSVAGINAL US OBSTETRIC: CPT

## 2018-02-13 PROCEDURE — 99212 OFFICE O/P EST SF 10 MIN: CPT

## 2018-02-13 RX ORDER — ACETAMINOPHEN 325 MG/1
325 TABLET, FILM COATED ORAL
Refills: 0 | Status: ACTIVE | COMMUNITY
Start: 2018-02-13

## 2018-02-13 RX ORDER — OXYCODONE HYDROCHLORIDE AND ACETAMINOPHEN 2.5; 325 MG/1; MG/1
2.5-325 TABLET ORAL
Refills: 0 | Status: DISCONTINUED | COMMUNITY
End: 2018-02-02

## 2018-02-16 ENCOUNTER — APPOINTMENT (OUTPATIENT)
Dept: MATERNAL FETAL MEDICINE | Facility: CLINIC | Age: 38
End: 2018-02-16
Payer: COMMERCIAL

## 2018-02-16 ENCOUNTER — ASOB RESULT (OUTPATIENT)
Age: 38
End: 2018-02-16

## 2018-02-16 VITALS — HEIGHT: 65 IN | WEIGHT: 140 LBS | BODY MASS INDEX: 23.32 KG/M2

## 2018-02-16 DIAGNOSIS — Z83.3 FAMILY HISTORY OF DIABETES MELLITUS: ICD-10-CM

## 2018-02-16 PROCEDURE — G0108 DIAB MANAGE TRN  PER INDIV: CPT

## 2018-03-01 ENCOUNTER — OUTPATIENT (OUTPATIENT)
Dept: OUTPATIENT SERVICES | Facility: HOSPITAL | Age: 38
LOS: 1 days | End: 2018-03-01
Payer: MEDICAID

## 2018-03-01 DIAGNOSIS — Z98.82 BREAST IMPLANT STATUS: Chronic | ICD-10-CM

## 2018-03-01 DIAGNOSIS — M16.9 OSTEOARTHRITIS OF HIP, UNSPECIFIED: Chronic | ICD-10-CM

## 2018-03-01 DIAGNOSIS — O47.02 FALSE LABOR BEFORE 37 COMPLETED WEEKS OF GESTATION, SECOND TRIMESTER: ICD-10-CM

## 2018-03-01 DIAGNOSIS — Z98.84 BARIATRIC SURGERY STATUS: Chronic | ICD-10-CM

## 2018-03-01 LAB — GLUCOSE BLDC GLUCOMTR-MCNC: 91 MG/DL — SIGNIFICANT CHANGE UP (ref 70–99)

## 2018-03-01 PROCEDURE — 59025 FETAL NON-STRESS TEST: CPT

## 2018-03-01 PROCEDURE — 83986 ASSAY PH BODY FLUID NOS: CPT

## 2018-03-01 PROCEDURE — G0463: CPT

## 2018-03-01 PROCEDURE — 82962 GLUCOSE BLOOD TEST: CPT

## 2018-03-21 ENCOUNTER — APPOINTMENT (OUTPATIENT)
Dept: PULMONOLOGY | Facility: CLINIC | Age: 38
End: 2018-03-21
Payer: COMMERCIAL

## 2018-03-21 VITALS
HEIGHT: 65 IN | DIASTOLIC BLOOD PRESSURE: 60 MMHG | SYSTOLIC BLOOD PRESSURE: 102 MMHG | WEIGHT: 145 LBS | BODY MASS INDEX: 24.16 KG/M2 | OXYGEN SATURATION: 98 % | HEART RATE: 105 BPM

## 2018-03-21 PROCEDURE — 94060 EVALUATION OF WHEEZING: CPT

## 2018-03-21 PROCEDURE — 94729 DIFFUSING CAPACITY: CPT

## 2018-03-21 PROCEDURE — 85018 HEMOGLOBIN: CPT | Mod: QW

## 2018-03-21 PROCEDURE — 94727 GAS DIL/WSHOT DETER LNG VOL: CPT

## 2018-03-21 PROCEDURE — 99204 OFFICE O/P NEW MOD 45 MIN: CPT | Mod: 25

## 2018-03-21 PROCEDURE — 99406 BEHAV CHNG SMOKING 3-10 MIN: CPT

## 2018-03-21 PROCEDURE — 94664 DEMO&/EVAL PT USE INHALER: CPT | Mod: 59

## 2018-03-26 ENCOUNTER — ASOB RESULT (OUTPATIENT)
Age: 38
End: 2018-03-26

## 2018-03-26 ENCOUNTER — APPOINTMENT (OUTPATIENT)
Dept: MATERNAL FETAL MEDICINE | Facility: CLINIC | Age: 38
End: 2018-03-26
Payer: COMMERCIAL

## 2018-03-26 ENCOUNTER — APPOINTMENT (OUTPATIENT)
Dept: ANTEPARTUM | Facility: CLINIC | Age: 38
End: 2018-03-26
Payer: COMMERCIAL

## 2018-03-26 VITALS
DIASTOLIC BLOOD PRESSURE: 70 MMHG | RESPIRATION RATE: 20 BRPM | WEIGHT: 152.06 LBS | BODY MASS INDEX: 25.33 KG/M2 | HEART RATE: 95 BPM | OXYGEN SATURATION: 98 % | SYSTOLIC BLOOD PRESSURE: 132 MMHG | HEIGHT: 65 IN

## 2018-03-26 PROCEDURE — 76816 OB US FOLLOW-UP PER FETUS: CPT

## 2018-03-26 PROCEDURE — 99243 OFF/OP CNSLTJ NEW/EST LOW 30: CPT

## 2018-04-10 ENCOUNTER — APPOINTMENT (OUTPATIENT)
Dept: MATERNAL FETAL MEDICINE | Facility: CLINIC | Age: 38
End: 2018-04-10
Payer: COMMERCIAL

## 2018-04-10 ENCOUNTER — APPOINTMENT (OUTPATIENT)
Dept: ANTEPARTUM | Facility: CLINIC | Age: 38
End: 2018-04-10
Payer: COMMERCIAL

## 2018-04-10 ENCOUNTER — ASOB RESULT (OUTPATIENT)
Age: 38
End: 2018-04-10

## 2018-04-10 VITALS
BODY MASS INDEX: 24.72 KG/M2 | SYSTOLIC BLOOD PRESSURE: 98 MMHG | HEIGHT: 65 IN | WEIGHT: 148.38 LBS | DIASTOLIC BLOOD PRESSURE: 60 MMHG

## 2018-04-10 PROCEDURE — 76817 TRANSVAGINAL US OBSTETRIC: CPT

## 2018-04-10 PROCEDURE — 76816 OB US FOLLOW-UP PER FETUS: CPT

## 2018-04-10 PROCEDURE — 99214 OFFICE O/P EST MOD 30 MIN: CPT

## 2018-04-10 RX ORDER — METHYLPREDNISOLONE 4 MG/1
4 TABLET ORAL
Qty: 15 | Refills: 3 | Status: DISCONTINUED | COMMUNITY
Start: 2018-03-21 | End: 2018-04-10

## 2018-04-24 ENCOUNTER — APPOINTMENT (OUTPATIENT)
Dept: PULMONOLOGY | Facility: CLINIC | Age: 38
End: 2018-04-24
Payer: COMMERCIAL

## 2018-04-24 VITALS
RESPIRATION RATE: 16 BRPM | SYSTOLIC BLOOD PRESSURE: 100 MMHG | WEIGHT: 154 LBS | DIASTOLIC BLOOD PRESSURE: 68 MMHG | HEART RATE: 96 BPM | OXYGEN SATURATION: 98 % | BODY MASS INDEX: 25.63 KG/M2

## 2018-04-24 PROCEDURE — 99214 OFFICE O/P EST MOD 30 MIN: CPT | Mod: 25

## 2018-04-24 RX ORDER — DEXAMETHASONE SODIUM PHOSPHATE 4 MG/ML
4 INJECTION, SOLUTION INTRAMUSCULAR; INTRAVENOUS
Qty: 0 | Refills: 0 | Status: COMPLETED | OUTPATIENT
Start: 2018-04-24

## 2018-04-24 RX ADMIN — DEXAMETHASONE SODIUM PHOSPHATE 1 MG/ML: 4 INJECTION, SOLUTION INTRA-ARTICULAR; INTRALESIONAL; INTRAMUSCULAR; INTRAVENOUS; SOFT TISSUE at 00:00

## 2018-05-01 ENCOUNTER — EMERGENCY (EMERGENCY)
Facility: HOSPITAL | Age: 38
LOS: 1 days | Discharge: OTHER INSTITUTION | End: 2018-05-01
Attending: STUDENT IN AN ORGANIZED HEALTH CARE EDUCATION/TRAINING PROGRAM
Payer: MEDICAID

## 2018-05-01 VITALS
WEIGHT: 154.1 LBS | HEART RATE: 99 BPM | SYSTOLIC BLOOD PRESSURE: 117 MMHG | HEIGHT: 65 IN | OXYGEN SATURATION: 98 % | DIASTOLIC BLOOD PRESSURE: 67 MMHG | RESPIRATION RATE: 20 BRPM | TEMPERATURE: 98 F

## 2018-05-01 DIAGNOSIS — Z98.82 BREAST IMPLANT STATUS: Chronic | ICD-10-CM

## 2018-05-01 DIAGNOSIS — Z98.84 BARIATRIC SURGERY STATUS: Chronic | ICD-10-CM

## 2018-05-01 DIAGNOSIS — M16.9 OSTEOARTHRITIS OF HIP, UNSPECIFIED: Chronic | ICD-10-CM

## 2018-05-01 PROCEDURE — 99285 EMERGENCY DEPT VISIT HI MDM: CPT | Mod: 25

## 2018-05-01 NOTE — ED ADULT TRIAGE NOTE - CHIEF COMPLAINT QUOTE
as per pt I am 31weeks preg I have diff breathing I see a pulmonologist I have been on prednisone for over a month.   also today my blood sugar was 34 as per pt I am 31weeks preg I have diff breathing I see a pulmonologist I have been on prednisone for over a month.   also today my blood sugar was 34  earlier but I drank milk and it is 140

## 2018-05-02 ENCOUNTER — OUTPATIENT (OUTPATIENT)
Dept: OUTPATIENT SERVICES | Facility: HOSPITAL | Age: 38
LOS: 1 days | End: 2018-05-02
Payer: MEDICAID

## 2018-05-02 DIAGNOSIS — Z98.84 BARIATRIC SURGERY STATUS: Chronic | ICD-10-CM

## 2018-05-02 DIAGNOSIS — Z98.82 BREAST IMPLANT STATUS: Chronic | ICD-10-CM

## 2018-05-02 DIAGNOSIS — O47.03 FALSE LABOR BEFORE 37 COMPLETED WEEKS OF GESTATION, THIRD TRIMESTER: ICD-10-CM

## 2018-05-02 DIAGNOSIS — M16.9 OSTEOARTHRITIS OF HIP, UNSPECIFIED: Chronic | ICD-10-CM

## 2018-05-02 LAB
ALBUMIN SERPL ELPH-MCNC: 3.4 G/DL — SIGNIFICANT CHANGE UP (ref 3.3–5.2)
ALP SERPL-CCNC: 59 U/L — SIGNIFICANT CHANGE UP (ref 40–120)
ALT FLD-CCNC: 18 U/L — SIGNIFICANT CHANGE UP
AMPHET UR-MCNC: NEGATIVE — SIGNIFICANT CHANGE UP
ANION GAP SERPL CALC-SCNC: 14 MMOL/L — SIGNIFICANT CHANGE UP (ref 5–17)
ANISOCYTOSIS BLD QL: SLIGHT — SIGNIFICANT CHANGE UP
APPEARANCE UR: CLEAR — SIGNIFICANT CHANGE UP
AST SERPL-CCNC: 17 U/L — SIGNIFICANT CHANGE UP
BARBITURATES UR SCN-MCNC: NEGATIVE — SIGNIFICANT CHANGE UP
BENZODIAZ UR-MCNC: NEGATIVE — SIGNIFICANT CHANGE UP
BILIRUB SERPL-MCNC: <0.2 MG/DL — LOW (ref 0.4–2)
BILIRUB UR-MCNC: NEGATIVE — SIGNIFICANT CHANGE UP
BUN SERPL-MCNC: 8 MG/DL — SIGNIFICANT CHANGE UP (ref 8–20)
CALCIUM SERPL-MCNC: 8.5 MG/DL — LOW (ref 8.6–10.2)
CHLORIDE SERPL-SCNC: 106 MMOL/L — SIGNIFICANT CHANGE UP (ref 98–107)
CO2 SERPL-SCNC: 18 MMOL/L — LOW (ref 22–29)
COCAINE METAB.OTHER UR-MCNC: NEGATIVE — SIGNIFICANT CHANGE UP
COLOR SPEC: YELLOW — SIGNIFICANT CHANGE UP
CREAT SERPL-MCNC: 0.31 MG/DL — LOW (ref 0.5–1.3)
DIFF PNL FLD: NEGATIVE — SIGNIFICANT CHANGE UP
GLUCOSE BLDC GLUCOMTR-MCNC: 88 MG/DL — SIGNIFICANT CHANGE UP (ref 70–99)
GLUCOSE BLDC GLUCOMTR-MCNC: 89 MG/DL — SIGNIFICANT CHANGE UP (ref 70–99)
GLUCOSE SERPL-MCNC: 79 MG/DL — SIGNIFICANT CHANGE UP (ref 70–115)
GLUCOSE UR QL: NEGATIVE MG/DL — SIGNIFICANT CHANGE UP
HCT VFR BLD CALC: 36 % — LOW (ref 37–47)
HGB BLD-MCNC: 11.7 G/DL — LOW (ref 12–16)
KETONES UR-MCNC: NEGATIVE — SIGNIFICANT CHANGE UP
LEUKOCYTE ESTERASE UR-ACNC: NEGATIVE — SIGNIFICANT CHANGE UP
LYMPHOCYTES # BLD AUTO: 21 % — SIGNIFICANT CHANGE UP (ref 20–55)
MACROCYTES BLD QL: SLIGHT — SIGNIFICANT CHANGE UP
MCHC RBC-ENTMCNC: 32.5 G/DL — SIGNIFICANT CHANGE UP (ref 32–36)
MCHC RBC-ENTMCNC: 32.7 PG — HIGH (ref 27–31)
MCV RBC AUTO: 100.6 FL — HIGH (ref 81–99)
METHADONE UR-MCNC: NEGATIVE — SIGNIFICANT CHANGE UP
MONOCYTES NFR BLD AUTO: 8 % — SIGNIFICANT CHANGE UP (ref 3–10)
NEUTROPHILS NFR BLD AUTO: 71 % — SIGNIFICANT CHANGE UP (ref 37–73)
NITRITE UR-MCNC: NEGATIVE — SIGNIFICANT CHANGE UP
OPIATES UR-MCNC: NEGATIVE — SIGNIFICANT CHANGE UP
OVALOCYTES BLD QL SMEAR: SLIGHT — SIGNIFICANT CHANGE UP
PCP SPEC-MCNC: SIGNIFICANT CHANGE UP
PCP UR-MCNC: NEGATIVE — SIGNIFICANT CHANGE UP
PH UR: 7 — SIGNIFICANT CHANGE UP (ref 5–8)
PLAT MORPH BLD: NORMAL — SIGNIFICANT CHANGE UP
PLATELET # BLD AUTO: 275 K/UL — SIGNIFICANT CHANGE UP (ref 150–400)
POIKILOCYTOSIS BLD QL AUTO: SLIGHT — SIGNIFICANT CHANGE UP
POTASSIUM SERPL-MCNC: 3.9 MMOL/L — SIGNIFICANT CHANGE UP (ref 3.5–5.3)
POTASSIUM SERPL-SCNC: 3.9 MMOL/L — SIGNIFICANT CHANGE UP (ref 3.5–5.3)
PROT SERPL-MCNC: 6 G/DL — LOW (ref 6.6–8.7)
PROT UR-MCNC: NEGATIVE MG/DL — SIGNIFICANT CHANGE UP
RBC # BLD: 3.58 M/UL — LOW (ref 4.4–5.2)
RBC # FLD: 16.5 % — HIGH (ref 11–15.6)
RBC BLD AUTO: ABNORMAL
SODIUM SERPL-SCNC: 138 MMOL/L — SIGNIFICANT CHANGE UP (ref 135–145)
SP GR SPEC: 1 — LOW (ref 1.01–1.02)
THC UR QL: NEGATIVE — SIGNIFICANT CHANGE UP
UROBILINOGEN FLD QL: NEGATIVE MG/DL — SIGNIFICANT CHANGE UP
WBC # BLD: 15.9 K/UL — HIGH (ref 4.8–10.8)
WBC # FLD AUTO: 15.9 K/UL — HIGH (ref 4.8–10.8)

## 2018-05-02 PROCEDURE — 99285 EMERGENCY DEPT VISIT HI MDM: CPT | Mod: 25

## 2018-05-02 PROCEDURE — 87086 URINE CULTURE/COLONY COUNT: CPT

## 2018-05-02 PROCEDURE — 80053 COMPREHEN METABOLIC PANEL: CPT

## 2018-05-02 PROCEDURE — G0463: CPT

## 2018-05-02 PROCEDURE — 85027 COMPLETE CBC AUTOMATED: CPT

## 2018-05-02 PROCEDURE — 82962 GLUCOSE BLOOD TEST: CPT

## 2018-05-02 PROCEDURE — 99219: CPT

## 2018-05-02 PROCEDURE — 81003 URINALYSIS AUTO W/O SCOPE: CPT

## 2018-05-02 PROCEDURE — 80307 DRUG TEST PRSMV CHEM ANLYZR: CPT

## 2018-05-02 PROCEDURE — 36415 COLL VENOUS BLD VENIPUNCTURE: CPT

## 2018-05-02 PROCEDURE — 59025 FETAL NON-STRESS TEST: CPT

## 2018-05-02 PROCEDURE — 94640 AIRWAY INHALATION TREATMENT: CPT

## 2018-05-02 RX ORDER — SODIUM CHLORIDE 9 MG/ML
3 INJECTION INTRAMUSCULAR; INTRAVENOUS; SUBCUTANEOUS EVERY 8 HOURS
Qty: 0 | Refills: 0 | Status: DISCONTINUED | OUTPATIENT
Start: 2018-05-02 | End: 2018-05-06

## 2018-05-02 RX ORDER — LEVALBUTEROL 1.25 MG/.5ML
0.63 SOLUTION, CONCENTRATE RESPIRATORY (INHALATION) ONCE
Qty: 0 | Refills: 0 | Status: COMPLETED | OUTPATIENT
Start: 2018-05-02 | End: 2018-05-02

## 2018-05-02 RX ORDER — SODIUM CHLORIDE 9 MG/ML
1000 INJECTION, SOLUTION INTRAVENOUS
Qty: 0 | Refills: 0 | Status: DISCONTINUED | OUTPATIENT
Start: 2018-05-02 | End: 2018-05-06

## 2018-05-02 RX ADMIN — LEVALBUTEROL 0.63 MILLIGRAM(S): 1.25 SOLUTION, CONCENTRATE RESPIRATORY (INHALATION) at 01:06

## 2018-05-02 NOTE — ED PROVIDER NOTE - CARE PLAN
Principal Discharge DX:	Asthma  Secondary Diagnosis:	Abdominal pain during pregnancy in third trimester  Secondary Diagnosis:	Hypoglycemia, suspected, in hospital patient

## 2018-05-02 NOTE — ED ADULT NURSE REASSESSMENT NOTE - NS ED NURSE REASSESS COMMENT FT1
Pt resp even and unlabored at this time. Pt complaining of abd cramping and needing to see OB now. Pt made aware they are aware to come to the ED for a consult and they are in surgery at this time. Dr. Berrios made aware of pts complaints. Pt laying in bed texting.

## 2018-05-02 NOTE — ED ADULT NURSE NOTE - CHIEF COMPLAINT QUOTE
as per pt I am 31weeks preg I have diff breathing I see a pulmonologist I have been on prednisone for over a month.   also today my blood sugar was 34  earlier but I drank milk and it is 140

## 2018-05-02 NOTE — ED PROVIDER NOTE - OBJECTIVE STATEMENT
37 y/o female who is 31 weeks pregnant with PMHx asthma and DM presents to the ED with c/o difficulty breathing, onset today. Per pt noted she had low blood sugar earlier today, around 38, which was alleviated with cookies and milk. PT attempted to alleviate with 48 mg of Xopenex x2 without relief, total around 5 treatments today, normally takes around 6 treatments per day. PT has been on Prednisone for the past 2 months secondary to asthma exacerbation. Pt states Per pt while at OB yesterday her BP was 80/50. PT reports [id number], wheezing, cough, and left lower abdominal pain. Pt denies fever, chills, n/v, and any other acute symptoms and complaints at this time.   Pulmonologist: Dr. Contreras  GYN: Dr. Munroe  High risk: Dr. Singletary

## 2018-05-02 NOTE — ED PROVIDER NOTE - PROGRESS NOTE DETAILS
OB/gyn service called but they are in surgery. Will call back. patient lungs are clear and breathing has improved. LAbs are as noted She complaining of increasing abdominal and back pain. Ob/gyn resident called back. Discussed case and patient to be transferred to L&D.

## 2018-05-02 NOTE — ED PROVIDER NOTE - RESPIRATORY, MLM
Scattered wheeze left lung. No respiratory distress. No rales, rhonchi, stridor, or evidence of tachypnea.

## 2018-05-02 NOTE — ED ADULT NURSE NOTE - OBJECTIVE STATEMENT
patient came to ER for low BS of 41 at home- sweating and SOB, patient is 31 weeks pregnant, at present accu-check 116 and expiratory wheeze noted, took 2 treatments at home feels a little better, patient in anxious, SOB, seen by pulmonology and placed on prednisone, has had symptoms for 1 month, patient at this time has abdominal cramping, awaiting MD

## 2018-05-03 LAB
CULTURE RESULTS: SIGNIFICANT CHANGE UP
SPECIMEN SOURCE: SIGNIFICANT CHANGE UP

## 2018-05-08 ENCOUNTER — APPOINTMENT (OUTPATIENT)
Dept: ANTEPARTUM | Facility: CLINIC | Age: 38
End: 2018-05-08
Payer: COMMERCIAL

## 2018-05-08 ENCOUNTER — ASOB RESULT (OUTPATIENT)
Age: 38
End: 2018-05-08

## 2018-05-08 ENCOUNTER — APPOINTMENT (OUTPATIENT)
Dept: MATERNAL FETAL MEDICINE | Facility: CLINIC | Age: 38
End: 2018-05-08
Payer: COMMERCIAL

## 2018-05-08 VITALS
WEIGHT: 159.5 LBS | DIASTOLIC BLOOD PRESSURE: 66 MMHG | OXYGEN SATURATION: 98 % | HEART RATE: 86 BPM | BODY MASS INDEX: 26.57 KG/M2 | SYSTOLIC BLOOD PRESSURE: 102 MMHG | HEIGHT: 65 IN | RESPIRATION RATE: 16 BRPM

## 2018-05-08 PROCEDURE — 99214 OFFICE O/P EST MOD 30 MIN: CPT

## 2018-05-08 PROCEDURE — 76816 OB US FOLLOW-UP PER FETUS: CPT

## 2018-05-14 ENCOUNTER — APPOINTMENT (OUTPATIENT)
Dept: ANTEPARTUM | Facility: CLINIC | Age: 38
End: 2018-05-14
Payer: COMMERCIAL

## 2018-05-14 ENCOUNTER — ASOB RESULT (OUTPATIENT)
Age: 38
End: 2018-05-14

## 2018-05-14 PROCEDURE — 76818 FETAL BIOPHYS PROFILE W/NST: CPT

## 2018-05-14 PROCEDURE — 76815 OB US LIMITED FETUS(S): CPT

## 2018-05-16 ENCOUNTER — APPOINTMENT (OUTPATIENT)
Dept: MATERNAL FETAL MEDICINE | Facility: CLINIC | Age: 38
End: 2018-05-16
Payer: COMMERCIAL

## 2018-05-16 ENCOUNTER — APPOINTMENT (OUTPATIENT)
Dept: ANTEPARTUM | Facility: CLINIC | Age: 38
End: 2018-05-16

## 2018-05-16 VITALS
DIASTOLIC BLOOD PRESSURE: 58 MMHG | HEIGHT: 65 IN | BODY MASS INDEX: 26.66 KG/M2 | SYSTOLIC BLOOD PRESSURE: 100 MMHG | HEART RATE: 88 BPM | OXYGEN SATURATION: 98 % | WEIGHT: 160 LBS

## 2018-05-16 DIAGNOSIS — Z34.90 ENCOUNTER FOR SUPERVISION OF NORMAL PREGNANCY, UNSPECIFIED, UNSPECIFIED TRIMESTER: ICD-10-CM

## 2018-05-16 PROCEDURE — 99214 OFFICE O/P EST MOD 30 MIN: CPT

## 2018-05-21 ENCOUNTER — ASOB RESULT (OUTPATIENT)
Age: 38
End: 2018-05-21

## 2018-05-21 ENCOUNTER — APPOINTMENT (OUTPATIENT)
Dept: ANTEPARTUM | Facility: CLINIC | Age: 38
End: 2018-05-21
Payer: COMMERCIAL

## 2018-05-21 PROCEDURE — 76818 FETAL BIOPHYS PROFILE W/NST: CPT

## 2018-05-21 PROCEDURE — 76815 OB US LIMITED FETUS(S): CPT

## 2018-05-24 ENCOUNTER — APPOINTMENT (OUTPATIENT)
Dept: PULMONOLOGY | Facility: CLINIC | Age: 38
End: 2018-05-24
Payer: COMMERCIAL

## 2018-05-24 VITALS
DIASTOLIC BLOOD PRESSURE: 60 MMHG | WEIGHT: 166 LBS | BODY MASS INDEX: 27.66 KG/M2 | HEART RATE: 74 BPM | OXYGEN SATURATION: 98 % | SYSTOLIC BLOOD PRESSURE: 100 MMHG | HEIGHT: 65 IN

## 2018-05-24 PROCEDURE — 99214 OFFICE O/P EST MOD 30 MIN: CPT

## 2018-05-29 ENCOUNTER — ASOB RESULT (OUTPATIENT)
Age: 38
End: 2018-05-29

## 2018-05-29 ENCOUNTER — APPOINTMENT (OUTPATIENT)
Dept: ANTEPARTUM | Facility: CLINIC | Age: 38
End: 2018-05-29
Payer: COMMERCIAL

## 2018-05-29 PROCEDURE — 76820 UMBILICAL ARTERY ECHO: CPT

## 2018-05-29 PROCEDURE — 76816 OB US FOLLOW-UP PER FETUS: CPT

## 2018-05-29 PROCEDURE — 76818 FETAL BIOPHYS PROFILE W/NST: CPT

## 2018-06-04 ENCOUNTER — APPOINTMENT (OUTPATIENT)
Dept: ANTEPARTUM | Facility: CLINIC | Age: 38
End: 2018-06-04
Payer: MEDICARE

## 2018-06-04 ENCOUNTER — ASOB RESULT (OUTPATIENT)
Age: 38
End: 2018-06-04

## 2018-06-04 ENCOUNTER — APPOINTMENT (OUTPATIENT)
Dept: MATERNAL FETAL MEDICINE | Facility: CLINIC | Age: 38
End: 2018-06-04
Payer: COMMERCIAL

## 2018-06-04 VITALS
WEIGHT: 168 LBS | BODY MASS INDEX: 27.99 KG/M2 | DIASTOLIC BLOOD PRESSURE: 72 MMHG | SYSTOLIC BLOOD PRESSURE: 124 MMHG | HEIGHT: 65 IN

## 2018-06-04 DIAGNOSIS — O99.330 SMOKING (TOBACCO) COMPLICATING PREGNANCY, UNSPECIFIED TRIMESTER: ICD-10-CM

## 2018-06-04 DIAGNOSIS — O24.319 UNSPECIFIED PRE-EXISTING DIABETES MELLITUS IN PREGNANCY, UNSPECIFIED TRIMESTER: ICD-10-CM

## 2018-06-04 DIAGNOSIS — O99.019 ANEMIA COMPLICATING PREGNANCY, UNSPECIFIED TRIMESTER: ICD-10-CM

## 2018-06-04 PROCEDURE — 76818 FETAL BIOPHYS PROFILE W/NST: CPT

## 2018-06-04 PROCEDURE — 99214 OFFICE O/P EST MOD 30 MIN: CPT

## 2018-06-07 ENCOUNTER — INPATIENT (INPATIENT)
Facility: HOSPITAL | Age: 38
LOS: 2 days | Discharge: HOME | End: 2018-06-10
Attending: OBSTETRICS & GYNECOLOGY | Admitting: OBSTETRICS & GYNECOLOGY
Payer: MEDICAID

## 2018-06-07 ENCOUNTER — RESULT REVIEW (OUTPATIENT)
Age: 38
End: 2018-06-07

## 2018-06-07 VITALS
OXYGEN SATURATION: 100 % | DIASTOLIC BLOOD PRESSURE: 78 MMHG | SYSTOLIC BLOOD PRESSURE: 111 MMHG | TEMPERATURE: 97 F | HEART RATE: 73 BPM | RESPIRATION RATE: 18 BRPM

## 2018-06-07 DIAGNOSIS — Z98.82 BREAST IMPLANT STATUS: Chronic | ICD-10-CM

## 2018-06-07 DIAGNOSIS — Z98.84 BARIATRIC SURGERY STATUS: Chronic | ICD-10-CM

## 2018-06-07 DIAGNOSIS — M16.9 OSTEOARTHRITIS OF HIP, UNSPECIFIED: Chronic | ICD-10-CM

## 2018-06-07 LAB
ANION GAP SERPL CALC-SCNC: 11 MMOL/L — SIGNIFICANT CHANGE UP (ref 5–17)
BASOPHILS # BLD AUTO: 0.03 K/UL — SIGNIFICANT CHANGE UP (ref 0–0.2)
BASOPHILS NFR BLD AUTO: 0.2 % — SIGNIFICANT CHANGE UP (ref 0–2)
BLD GP AB SCN SERPL QL: SIGNIFICANT CHANGE UP
BUN SERPL-MCNC: 11 MG/DL — SIGNIFICANT CHANGE UP (ref 7–23)
CALCIUM SERPL-MCNC: 8.6 MG/DL — SIGNIFICANT CHANGE UP (ref 8.5–10.1)
CHLORIDE SERPL-SCNC: 109 MMOL/L — HIGH (ref 96–108)
CO2 SERPL-SCNC: 19 MMOL/L — LOW (ref 22–31)
CREAT SERPL-MCNC: 0.43 MG/DL — LOW (ref 0.5–1.3)
EOSINOPHIL # BLD AUTO: 0.02 K/UL — SIGNIFICANT CHANGE UP (ref 0–0.5)
EOSINOPHIL NFR BLD AUTO: 0.1 % — SIGNIFICANT CHANGE UP (ref 0–6)
GLUCOSE SERPL-MCNC: 68 MG/DL — LOW (ref 70–99)
HCT VFR BLD CALC: 34.2 % — LOW (ref 34.5–45)
HGB BLD-MCNC: 11.5 G/DL — SIGNIFICANT CHANGE UP (ref 11.5–15.5)
IMM GRANULOCYTES NFR BLD AUTO: 0.6 % — SIGNIFICANT CHANGE UP (ref 0–1.5)
LYMPHOCYTES # BLD AUTO: 16 % — SIGNIFICANT CHANGE UP (ref 13–44)
LYMPHOCYTES # BLD AUTO: 2.3 K/UL — SIGNIFICANT CHANGE UP (ref 1–3.3)
MCHC RBC-ENTMCNC: 33.6 GM/DL — SIGNIFICANT CHANGE UP (ref 32–36)
MCHC RBC-ENTMCNC: 34.6 PG — HIGH (ref 27–34)
MCV RBC AUTO: 103 FL — HIGH (ref 80–100)
MONOCYTES # BLD AUTO: 1.26 K/UL — HIGH (ref 0–0.9)
MONOCYTES NFR BLD AUTO: 8.8 % — SIGNIFICANT CHANGE UP (ref 2–14)
NEUTROPHILS # BLD AUTO: 10.69 K/UL — HIGH (ref 1.8–7.4)
NEUTROPHILS NFR BLD AUTO: 74.3 % — SIGNIFICANT CHANGE UP (ref 43–77)
NRBC # BLD: 0 /100 WBCS — SIGNIFICANT CHANGE UP (ref 0–0)
PLATELET # BLD AUTO: 228 K/UL — SIGNIFICANT CHANGE UP (ref 150–400)
POTASSIUM SERPL-MCNC: 3.5 MMOL/L — SIGNIFICANT CHANGE UP (ref 3.5–5.3)
POTASSIUM SERPL-SCNC: 3.5 MMOL/L — SIGNIFICANT CHANGE UP (ref 3.5–5.3)
RBC # BLD: 3.32 M/UL — LOW (ref 3.8–5.2)
RBC # FLD: 15 % — HIGH (ref 10.3–14.5)
SODIUM SERPL-SCNC: 139 MMOL/L — SIGNIFICANT CHANGE UP (ref 135–145)
TYPE + AB SCN PNL BLD: SIGNIFICANT CHANGE UP
WBC # BLD: 14.38 K/UL — HIGH (ref 3.8–10.5)
WBC # FLD AUTO: 14.38 K/UL — HIGH (ref 3.8–10.5)

## 2018-06-07 PROCEDURE — 88307 TISSUE EXAM BY PATHOLOGIST: CPT | Mod: 26

## 2018-06-07 PROCEDURE — 88302 TISSUE EXAM BY PATHOLOGIST: CPT | Mod: 26

## 2018-06-07 RX ORDER — CEFAZOLIN SODIUM 1 G
2000 VIAL (EA) INJECTION ONCE
Qty: 0 | Refills: 0 | Status: DISCONTINUED | OUTPATIENT
Start: 2018-06-07 | End: 2018-06-10

## 2018-06-07 RX ORDER — OXYCODONE HYDROCHLORIDE 5 MG/1
5 TABLET ORAL
Qty: 0 | Refills: 0 | Status: DISCONTINUED | OUTPATIENT
Start: 2018-06-07 | End: 2018-06-07

## 2018-06-07 RX ORDER — DIPHENHYDRAMINE HCL 50 MG
25 CAPSULE ORAL EVERY 6 HOURS
Qty: 0 | Refills: 0 | Status: DISCONTINUED | OUTPATIENT
Start: 2018-06-08 | End: 2018-06-10

## 2018-06-07 RX ORDER — SODIUM CHLORIDE 9 MG/ML
1000 INJECTION, SOLUTION INTRAVENOUS
Qty: 0 | Refills: 0 | Status: DISCONTINUED | OUTPATIENT
Start: 2018-06-07 | End: 2018-06-07

## 2018-06-07 RX ORDER — AZITHROMYCIN 500 MG/1
500 TABLET, FILM COATED ORAL ONCE
Qty: 0 | Refills: 0 | Status: COMPLETED | OUTPATIENT
Start: 2018-06-07 | End: 2018-06-07

## 2018-06-07 RX ORDER — ENOXAPARIN SODIUM 100 MG/ML
40 INJECTION SUBCUTANEOUS
Qty: 0 | Refills: 0 | Status: DISCONTINUED | OUTPATIENT
Start: 2018-06-08 | End: 2018-06-08

## 2018-06-07 RX ORDER — LANOLIN
1 OINTMENT (GRAM) TOPICAL
Qty: 0 | Refills: 0 | Status: DISCONTINUED | OUTPATIENT
Start: 2018-06-08 | End: 2018-06-10

## 2018-06-07 RX ORDER — DEXTROSE 50 % IN WATER 50 %
12.5 SYRINGE (ML) INTRAVENOUS ONCE
Qty: 0 | Refills: 0 | Status: DISCONTINUED | OUTPATIENT
Start: 2018-06-07 | End: 2018-06-10

## 2018-06-07 RX ORDER — ONDANSETRON 8 MG/1
4 TABLET, FILM COATED ORAL EVERY 6 HOURS
Qty: 0 | Refills: 0 | Status: DISCONTINUED | OUTPATIENT
Start: 2018-06-07 | End: 2018-06-07

## 2018-06-07 RX ORDER — SIMETHICONE 80 MG/1
80 TABLET, CHEWABLE ORAL EVERY 4 HOURS
Qty: 0 | Refills: 0 | Status: DISCONTINUED | OUTPATIENT
Start: 2018-06-08 | End: 2018-06-10

## 2018-06-07 RX ORDER — ACETAMINOPHEN 500 MG
1000 TABLET ORAL ONCE
Qty: 0 | Refills: 0 | Status: COMPLETED | OUTPATIENT
Start: 2018-06-07 | End: 2018-06-08

## 2018-06-07 RX ORDER — SODIUM CHLORIDE 9 MG/ML
1000 INJECTION, SOLUTION INTRAVENOUS ONCE
Qty: 0 | Refills: 0 | Status: COMPLETED | OUTPATIENT
Start: 2018-06-07 | End: 2018-06-07

## 2018-06-07 RX ORDER — ACETAMINOPHEN 500 MG
650 TABLET ORAL EVERY 6 HOURS
Qty: 0 | Refills: 0 | Status: DISCONTINUED | OUTPATIENT
Start: 2018-06-08 | End: 2018-06-10

## 2018-06-07 RX ORDER — GABAPENTIN 400 MG/1
600 CAPSULE ORAL
Qty: 0 | Refills: 0 | Status: DISCONTINUED | OUTPATIENT
Start: 2018-06-07 | End: 2018-06-10

## 2018-06-07 RX ORDER — CITRIC ACID/SODIUM CITRATE 300-500 MG
30 SOLUTION, ORAL ORAL ONCE
Qty: 0 | Refills: 0 | Status: DISCONTINUED | OUTPATIENT
Start: 2018-06-07 | End: 2018-06-07

## 2018-06-07 RX ORDER — GLYCERIN ADULT
1 SUPPOSITORY, RECTAL RECTAL AT BEDTIME
Qty: 0 | Refills: 0 | Status: DISCONTINUED | OUTPATIENT
Start: 2018-06-08 | End: 2018-06-10

## 2018-06-07 RX ORDER — TETANUS TOXOID, REDUCED DIPHTHERIA TOXOID AND ACELLULAR PERTUSSIS VACCINE, ADSORBED 5; 2.5; 8; 8; 2.5 [IU]/.5ML; [IU]/.5ML; UG/.5ML; UG/.5ML; UG/.5ML
0.5 SUSPENSION INTRAMUSCULAR ONCE
Qty: 0 | Refills: 0 | Status: DISCONTINUED | OUTPATIENT
Start: 2018-06-08 | End: 2018-06-10

## 2018-06-07 RX ORDER — DEXTROSE 50 % IN WATER 50 %
15 SYRINGE (ML) INTRAVENOUS ONCE
Qty: 0 | Refills: 0 | Status: DISCONTINUED | OUTPATIENT
Start: 2018-06-07 | End: 2018-06-10

## 2018-06-07 RX ORDER — SODIUM CHLORIDE 9 MG/ML
1000 INJECTION, SOLUTION INTRAVENOUS
Qty: 0 | Refills: 0 | Status: DISCONTINUED | OUTPATIENT
Start: 2018-06-08 | End: 2018-06-10

## 2018-06-07 RX ORDER — DOCUSATE SODIUM 100 MG
100 CAPSULE ORAL
Qty: 0 | Refills: 0 | Status: DISCONTINUED | OUTPATIENT
Start: 2018-06-08 | End: 2018-06-10

## 2018-06-07 RX ORDER — MORPHINE SULFATE 50 MG/1
4 CAPSULE, EXTENDED RELEASE ORAL
Qty: 0 | Refills: 0 | Status: DISCONTINUED | OUTPATIENT
Start: 2018-06-07 | End: 2018-06-07

## 2018-06-07 RX ORDER — NALOXONE HYDROCHLORIDE 4 MG/.1ML
0.1 SPRAY NASAL
Qty: 0 | Refills: 0 | Status: DISCONTINUED | OUTPATIENT
Start: 2018-06-07 | End: 2018-06-07

## 2018-06-07 RX ORDER — HYDROMORPHONE HYDROCHLORIDE 2 MG/ML
1 INJECTION INTRAMUSCULAR; INTRAVENOUS; SUBCUTANEOUS
Qty: 0 | Refills: 0 | Status: DISCONTINUED | OUTPATIENT
Start: 2018-06-07 | End: 2018-06-08

## 2018-06-07 RX ORDER — FERROUS SULFATE 325(65) MG
325 TABLET ORAL DAILY
Qty: 0 | Refills: 0 | Status: DISCONTINUED | OUTPATIENT
Start: 2018-06-08 | End: 2018-06-10

## 2018-06-07 RX ORDER — DEXTROSE 50 % IN WATER 50 %
25 SYRINGE (ML) INTRAVENOUS ONCE
Qty: 0 | Refills: 0 | Status: DISCONTINUED | OUTPATIENT
Start: 2018-06-07 | End: 2018-06-10

## 2018-06-07 RX ORDER — METOCLOPRAMIDE HCL 10 MG
10 TABLET ORAL ONCE
Qty: 0 | Refills: 0 | Status: DISCONTINUED | OUTPATIENT
Start: 2018-06-07 | End: 2018-06-07

## 2018-06-07 RX ORDER — OXYTOCIN 10 UNIT/ML
41.67 VIAL (ML) INJECTION
Qty: 20 | Refills: 0 | Status: DISCONTINUED | OUTPATIENT
Start: 2018-06-08 | End: 2018-06-10

## 2018-06-07 RX ORDER — OXYTOCIN 10 UNIT/ML
333.33 VIAL (ML) INJECTION
Qty: 20 | Refills: 0 | Status: DISCONTINUED | OUTPATIENT
Start: 2018-06-08 | End: 2018-06-10

## 2018-06-07 RX ORDER — OXYCODONE HYDROCHLORIDE 5 MG/1
10 TABLET ORAL
Qty: 0 | Refills: 0 | Status: DISCONTINUED | OUTPATIENT
Start: 2018-06-07 | End: 2018-06-07

## 2018-06-07 RX ORDER — HYDROMORPHONE HYDROCHLORIDE 2 MG/ML
1 INJECTION INTRAMUSCULAR; INTRAVENOUS; SUBCUTANEOUS
Qty: 0 | Refills: 0 | Status: DISCONTINUED | OUTPATIENT
Start: 2018-06-07 | End: 2018-06-07

## 2018-06-07 RX ORDER — GLUCAGON INJECTION, SOLUTION 0.5 MG/.1ML
1 INJECTION, SOLUTION SUBCUTANEOUS ONCE
Qty: 0 | Refills: 0 | Status: DISCONTINUED | OUTPATIENT
Start: 2018-06-07 | End: 2018-06-10

## 2018-06-07 RX ORDER — INSULIN LISPRO 100/ML
VIAL (ML) SUBCUTANEOUS
Qty: 0 | Refills: 0 | Status: DISCONTINUED | OUTPATIENT
Start: 2018-06-07 | End: 2018-06-10

## 2018-06-07 RX ORDER — SODIUM CHLORIDE 9 MG/ML
1000 INJECTION, SOLUTION INTRAVENOUS
Qty: 0 | Refills: 0 | Status: DISCONTINUED | OUTPATIENT
Start: 2018-06-07 | End: 2018-06-10

## 2018-06-07 RX ORDER — MORPHINE SULFATE 50 MG/1
0.2 CAPSULE, EXTENDED RELEASE ORAL ONCE
Qty: 0 | Refills: 0 | Status: DISCONTINUED | OUTPATIENT
Start: 2018-06-07 | End: 2018-06-07

## 2018-06-07 RX ADMIN — AZITHROMYCIN 255 MILLIGRAM(S): 500 TABLET, FILM COATED ORAL at 21:20

## 2018-06-07 RX ADMIN — SODIUM CHLORIDE 2000 MILLILITER(S): 9 INJECTION, SOLUTION INTRAVENOUS at 20:45

## 2018-06-08 LAB
GLUCOSE BLDC GLUCOMTR-MCNC: 141 MG/DL — HIGH (ref 70–99)
T PALLIDUM AB TITR SER: NEGATIVE — SIGNIFICANT CHANGE UP

## 2018-06-08 PROCEDURE — 99223 1ST HOSP IP/OBS HIGH 75: CPT

## 2018-06-08 RX ORDER — PANTOPRAZOLE SODIUM 20 MG/1
40 TABLET, DELAYED RELEASE ORAL
Qty: 0 | Refills: 0 | Status: DISCONTINUED | OUTPATIENT
Start: 2018-06-08 | End: 2018-06-10

## 2018-06-08 RX ORDER — PREGABALIN 225 MG/1
100 CAPSULE ORAL AT BEDTIME
Qty: 0 | Refills: 0 | Status: DISCONTINUED | OUTPATIENT
Start: 2018-06-08 | End: 2018-06-10

## 2018-06-08 RX ORDER — ASCORBIC ACID 60 MG
500 TABLET,CHEWABLE ORAL AT BEDTIME
Qty: 0 | Refills: 0 | Status: DISCONTINUED | OUTPATIENT
Start: 2018-06-08 | End: 2018-06-10

## 2018-06-08 RX ORDER — HYDROCORTISONE 1 %
1 OINTMENT (GRAM) TOPICAL THREE TIMES A DAY
Qty: 0 | Refills: 0 | Status: DISCONTINUED | OUTPATIENT
Start: 2018-06-08 | End: 2018-06-10

## 2018-06-08 RX ORDER — HYDROCORTISONE 1 %
1 OINTMENT (GRAM) TOPICAL
Qty: 0 | Refills: 0 | Status: DISCONTINUED | OUTPATIENT
Start: 2018-06-08 | End: 2018-06-08

## 2018-06-08 RX ORDER — FOLIC ACID 0.8 MG
4 TABLET ORAL DAILY
Qty: 0 | Refills: 0 | Status: DISCONTINUED | OUTPATIENT
Start: 2018-06-08 | End: 2018-06-10

## 2018-06-08 RX ORDER — ONDANSETRON 8 MG/1
4 TABLET, FILM COATED ORAL ONCE
Qty: 0 | Refills: 0 | Status: COMPLETED | OUTPATIENT
Start: 2018-06-08 | End: 2018-06-08

## 2018-06-08 RX ORDER — OXYCODONE AND ACETAMINOPHEN 5; 325 MG/1; MG/1
1 TABLET ORAL EVERY 4 HOURS
Qty: 0 | Refills: 0 | Status: DISCONTINUED | OUTPATIENT
Start: 2018-06-08 | End: 2018-06-09

## 2018-06-08 RX ORDER — BUDESONIDE, MICRONIZED 100 %
0.25 POWDER (GRAM) MISCELLANEOUS AT BEDTIME
Qty: 0 | Refills: 0 | Status: DISCONTINUED | OUTPATIENT
Start: 2018-06-08 | End: 2018-06-10

## 2018-06-08 RX ORDER — FERROUS SULFATE 325(65) MG
325 TABLET ORAL AT BEDTIME
Qty: 0 | Refills: 0 | Status: DISCONTINUED | OUTPATIENT
Start: 2018-06-08 | End: 2018-06-10

## 2018-06-08 RX ORDER — ENOXAPARIN SODIUM 100 MG/ML
40 INJECTION SUBCUTANEOUS DAILY
Qty: 0 | Refills: 0 | Status: DISCONTINUED | OUTPATIENT
Start: 2018-06-09 | End: 2018-06-10

## 2018-06-08 RX ORDER — SODIUM CHLORIDE 9 MG/ML
1000 INJECTION, SOLUTION INTRAVENOUS
Qty: 0 | Refills: 0 | Status: DISCONTINUED | OUTPATIENT
Start: 2018-06-08 | End: 2018-06-08

## 2018-06-08 RX ORDER — ONDANSETRON 8 MG/1
8 TABLET, FILM COATED ORAL DAILY
Qty: 0 | Refills: 0 | Status: DISCONTINUED | OUTPATIENT
Start: 2018-06-08 | End: 2018-06-10

## 2018-06-08 RX ORDER — FLUTICASONE PROPIONATE 50 MCG
2 SPRAY, SUSPENSION NASAL
Qty: 0 | Refills: 0 | Status: DISCONTINUED | OUTPATIENT
Start: 2018-06-08 | End: 2018-06-10

## 2018-06-08 RX ORDER — ALBUTEROL 90 UG/1
2 AEROSOL, METERED ORAL EVERY 6 HOURS
Qty: 0 | Refills: 0 | Status: DISCONTINUED | OUTPATIENT
Start: 2018-06-08 | End: 2018-06-10

## 2018-06-08 RX ORDER — MONTELUKAST 4 MG/1
10 TABLET, CHEWABLE ORAL AT BEDTIME
Qty: 0 | Refills: 0 | Status: DISCONTINUED | OUTPATIENT
Start: 2018-06-08 | End: 2018-06-10

## 2018-06-08 RX ORDER — OXYCODONE AND ACETAMINOPHEN 5; 325 MG/1; MG/1
2 TABLET ORAL EVERY 4 HOURS
Qty: 0 | Refills: 0 | Status: DISCONTINUED | OUTPATIENT
Start: 2018-06-08 | End: 2018-06-09

## 2018-06-08 RX ADMIN — OXYCODONE AND ACETAMINOPHEN 2 TABLET(S): 5; 325 TABLET ORAL at 23:06

## 2018-06-08 RX ADMIN — PANTOPRAZOLE SODIUM 40 MILLIGRAM(S): 20 TABLET, DELAYED RELEASE ORAL at 11:16

## 2018-06-08 RX ADMIN — OXYCODONE AND ACETAMINOPHEN 2 TABLET(S): 5; 325 TABLET ORAL at 16:00

## 2018-06-08 RX ADMIN — ONDANSETRON 4 MILLIGRAM(S): 8 TABLET, FILM COATED ORAL at 02:23

## 2018-06-08 RX ADMIN — OXYCODONE AND ACETAMINOPHEN 2 TABLET(S): 5; 325 TABLET ORAL at 18:42

## 2018-06-08 RX ADMIN — Medication 400 MILLIGRAM(S): at 02:14

## 2018-06-08 RX ADMIN — HYDROMORPHONE HYDROCHLORIDE 1 MILLIGRAM(S): 2 INJECTION INTRAMUSCULAR; INTRAVENOUS; SUBCUTANEOUS at 04:00

## 2018-06-08 RX ADMIN — HYDROMORPHONE HYDROCHLORIDE 1 MILLIGRAM(S): 2 INJECTION INTRAMUSCULAR; INTRAVENOUS; SUBCUTANEOUS at 00:49

## 2018-06-08 RX ADMIN — HYDROMORPHONE HYDROCHLORIDE 1 MILLIGRAM(S): 2 INJECTION INTRAMUSCULAR; INTRAVENOUS; SUBCUTANEOUS at 08:44

## 2018-06-08 RX ADMIN — MONTELUKAST 10 MILLIGRAM(S): 4 TABLET, CHEWABLE ORAL at 23:48

## 2018-06-08 RX ADMIN — OXYCODONE AND ACETAMINOPHEN 2 TABLET(S): 5; 325 TABLET ORAL at 14:37

## 2018-06-08 RX ADMIN — PANTOPRAZOLE SODIUM 40 MILLIGRAM(S): 20 TABLET, DELAYED RELEASE ORAL at 18:46

## 2018-06-08 RX ADMIN — ENOXAPARIN SODIUM 40 MILLIGRAM(S): 100 INJECTION SUBCUTANEOUS at 12:00

## 2018-06-08 NOTE — PROGRESS NOTE ADULT - ASSESSMENT
37 yo Female GP POD#1: . Patient is doing well and has no acute compliants. Pain is mananged well with medications. Patient is tolerating regular diet. Patient is OOB, and urinating w/o any difficulities. DeniesBowel movment/Gas. Patient is pumping w/o any complications.     RH Status   Rubella Status:     CV: normal S1 and S2, rate and rhythm   Lungs: clear b/l   Abdo: Fundus firm w/ or w/o tenderness?  Incision: Bandages clean and intact   PV: no calf tenderness, edema     Vital Signs Last 24 Hrs  T(C): 36.1 (07 Jun 2018 23:35), Max: 36.1 (07 Jun 2018 23:35)  T(F): 97 (07 Jun 2018 23:35), Max: 97 (07 Jun 2018 23:35)  HR: 80 (08 Jun 2018 00:45) (73 - 80)  BP: 129/66 (08 Jun 2018 00:45) (107/70 - 129/66)  BP(mean): --  RR: 16 (08 Jun 2018 00:45) (16 - 18)  SpO2: 100% (08 Jun 2018 00:45) (100% - 100%)    CBC Full  -  ( 07 Jun 2018 21:07 )  WBC Count : 14.38 K/uL  Hemoglobin : 11.5 g/dL  Hematocrit : 34.2 %  Platelet Count - Automated : 228 K/uL  Mean Cell Volume : 103.0 fl  Mean Cell Hemoglobin : 34.6 pg  Mean Cell Hemoglobin Concentration : 33.6 gm/dL  Auto Neutrophil # : 10.69 K/uL  Auto Lymphocyte # : 2.30 K/uL  Auto Monocyte # : 1.26 K/uL  Auto Eosinophil # : 0.02 K/uL  Auto Basophil # : 0.03 K/uL  Auto Neutrophil % : 74.3 %  Auto Lymphocyte % : 16.0 %  Auto Monocyte % : 8.8 %  Auto Eosinophil % : 0.1 %  Auto Basophil % : 0.2 %      06-07    139  |  109<H>  |  11  ----------------------------<  68<L>  3.5   |  19<L>  |  0.43<L>    Ca    8.6      07 Jun 2018 21:07

## 2018-06-08 NOTE — CONSULT NOTE ADULT - ASSESSMENT
38 year old female, s/p C section POD #1, with history of  back pain due to injuries, was on opiates before pregnancy, which pt states she stopped after discovering that she is pregnant. Pt was labile last night and irritable and verbally aggressive, psychiatry consulted to r/o any psychiatric issues.     Impression    No acute psychiatric symptoms  Irritability and verbal aggression as part of her personality and likely coping mechanism for distrust in others.   No evidence of any immanent danger to self or baby. Pt seemed very concerned and was aching to see her baby.    Recommendations:    pt cleared psychiatrically.

## 2018-06-08 NOTE — CONSULT NOTE ADULT - SUBJECTIVE AND OBJECTIVE BOX
38 year old female, s/p C section POD #1, with history of  back pain due to injuries, was on opiates before pregnancy, which pt states she stopped after discovering that she is pregnant. Pt was labile last night and irritable and verbally aggressive, psychiatry consulted to r/o any psychiatric issues.     Upon interview, pt is hostile and offended that psychiatry was called and initially refused interview, but rapport slowly built and pt was able to express that she was very upset at being treated with the  stigma of a a drug addict, despite her and her  baby girl's tox screens being negative.  She stated that she feels the staff last night were "ganging up on me," and prevented from seeing her daughter who was in the NICU for respiratory distress. Pt was distraught that she couldn't see her daughter and was tearful when speaking about she should be holding the baby now. Denies depression, denies SI,/HI, denies any thoughts of harm to baby. No AH/VH/paranoia. Pt overall distrustful of staff, defensive and quick to take offense. Rapport ended when pt was questioned about any etoh and drug use and or rehab, despite examiner telling patient that these are questioned asked in every psychiatric interview and she asked that interview be over.   On unit staff state that she continuously asks for pain meds.       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   Living situation: Lives with  and 2 other children aged 10 and 17    Past Psychiatric Hx  Past hospitalizations: denies  Past rehab: would not answer  Past suicidality/aggression: denies    Family History of Psychiatric/substance abuse: n/a    Review of Sx: pain, All other systems negative    Risk assessment : no evidence of any imminent risk to self or baby at this time.     Mental Status Exam  Oriented x3 and to situation  General Appearance:  well nourished, average build, pt with multiple tatoos over body   Behavior: cooperative, good eye contact, well related  Muscle tone/ Strength: No abnormal movements  Gait/Station: not observed  Speech: normal volume and rate, spontaneous, normal articulation  Mood: anxious, irritated  Affect: congruent, full range  Thought Process: wnl, linear, goal oriented  Thought Associations: normal  Thought Content: wnl  Perceptions: wnl  Attention/Concentration: wnl  Recent Memory: wnl  Remote Memory: wnl  Fund of knowledge: wnl  Language: wnl, English speaking  Judgement : fair in terms of baby and making decisions to advance welfare of child  Insight: insight into emotional life limited.       Labs, EKG and imaging reviewed        139  |  109<H>  |  11  ----------------------------<  68<L>  3.5   |  19<L>  |  0.43<L>    Ca    8.6      2018 21:07                          11.5   14.38 )-----------( 228      ( 2018 21:07 )             34.2

## 2018-06-09 LAB
BASOPHILS # BLD AUTO: 0.04 K/UL — SIGNIFICANT CHANGE UP (ref 0–0.2)
BASOPHILS NFR BLD AUTO: 0.3 % — SIGNIFICANT CHANGE UP (ref 0–2)
EOSINOPHIL # BLD AUTO: 0.07 K/UL — SIGNIFICANT CHANGE UP (ref 0–0.5)
EOSINOPHIL NFR BLD AUTO: 0.5 % — SIGNIFICANT CHANGE UP (ref 0–6)
FETAL SCREEN: SIGNIFICANT CHANGE UP
HCT VFR BLD CALC: 35.3 % — SIGNIFICANT CHANGE UP (ref 34.5–45)
HGB BLD-MCNC: 11.9 G/DL — SIGNIFICANT CHANGE UP (ref 11.5–15.5)
IMM GRANULOCYTES NFR BLD AUTO: 0.3 % — SIGNIFICANT CHANGE UP (ref 0–1.5)
LYMPHOCYTES # BLD AUTO: 14.7 % — SIGNIFICANT CHANGE UP (ref 13–44)
LYMPHOCYTES # BLD AUTO: 2.15 K/UL — SIGNIFICANT CHANGE UP (ref 1–3.3)
MCHC RBC-ENTMCNC: 33.7 GM/DL — SIGNIFICANT CHANGE UP (ref 32–36)
MCHC RBC-ENTMCNC: 35 PG — HIGH (ref 27–34)
MCV RBC AUTO: 103.8 FL — HIGH (ref 80–100)
MONOCYTES # BLD AUTO: 1.25 K/UL — HIGH (ref 0–0.9)
MONOCYTES NFR BLD AUTO: 8.5 % — SIGNIFICANT CHANGE UP (ref 2–14)
NEUTROPHILS # BLD AUTO: 11.07 K/UL — HIGH (ref 1.8–7.4)
NEUTROPHILS NFR BLD AUTO: 75.7 % — SIGNIFICANT CHANGE UP (ref 43–77)
NRBC # BLD: 0 /100 WBCS — SIGNIFICANT CHANGE UP (ref 0–0)
PLATELET # BLD AUTO: 250 K/UL — SIGNIFICANT CHANGE UP (ref 150–400)
RBC # BLD: 3.4 M/UL — LOW (ref 3.8–5.2)
RBC # FLD: 14.8 % — HIGH (ref 10.3–14.5)
WBC # BLD: 14.63 K/UL — HIGH (ref 3.8–10.5)
WBC # FLD AUTO: 14.63 K/UL — HIGH (ref 3.8–10.5)

## 2018-06-09 RX ORDER — HYDROMORPHONE HYDROCHLORIDE 2 MG/ML
1 INJECTION INTRAMUSCULAR; INTRAVENOUS; SUBCUTANEOUS EVERY 4 HOURS
Qty: 0 | Refills: 0 | Status: DISCONTINUED | OUTPATIENT
Start: 2018-06-09 | End: 2018-06-09

## 2018-06-09 RX ORDER — AER TRAVELER 0.5 G/1
1 SOLUTION RECTAL; TOPICAL
Qty: 0 | Refills: 0 | Status: DISCONTINUED | OUTPATIENT
Start: 2018-06-09 | End: 2018-06-10

## 2018-06-09 RX ORDER — ALPRAZOLAM 0.25 MG
0.25 TABLET ORAL THREE TIMES A DAY
Qty: 0 | Refills: 0 | Status: DISCONTINUED | OUTPATIENT
Start: 2018-06-09 | End: 2018-06-10

## 2018-06-09 RX ORDER — HYDROMORPHONE HYDROCHLORIDE 2 MG/ML
2 INJECTION INTRAMUSCULAR; INTRAVENOUS; SUBCUTANEOUS EVERY 4 HOURS
Qty: 0 | Refills: 0 | Status: DISCONTINUED | OUTPATIENT
Start: 2018-06-09 | End: 2018-06-09

## 2018-06-09 RX ORDER — HYDROMORPHONE HYDROCHLORIDE 2 MG/ML
1 INJECTION INTRAMUSCULAR; INTRAVENOUS; SUBCUTANEOUS ONCE
Qty: 0 | Refills: 0 | Status: DISCONTINUED | OUTPATIENT
Start: 2018-06-09 | End: 2018-06-09

## 2018-06-09 RX ORDER — HYDROMORPHONE HYDROCHLORIDE 2 MG/ML
2 INJECTION INTRAMUSCULAR; INTRAVENOUS; SUBCUTANEOUS EVERY 4 HOURS
Qty: 0 | Refills: 0 | Status: DISCONTINUED | OUTPATIENT
Start: 2018-06-10 | End: 2018-06-10

## 2018-06-09 RX ORDER — FENTANYL CITRATE 50 UG/ML
50 INJECTION INTRAVENOUS
Qty: 0 | Refills: 0 | Status: DISCONTINUED | OUTPATIENT
Start: 2018-06-09 | End: 2018-06-09

## 2018-06-09 RX ADMIN — GABAPENTIN 600 MILLIGRAM(S): 400 CAPSULE ORAL at 16:47

## 2018-06-09 RX ADMIN — GABAPENTIN 600 MILLIGRAM(S): 400 CAPSULE ORAL at 03:36

## 2018-06-09 RX ADMIN — MONTELUKAST 10 MILLIGRAM(S): 4 TABLET, CHEWABLE ORAL at 23:10

## 2018-06-09 RX ADMIN — GABAPENTIN 600 MILLIGRAM(S): 400 CAPSULE ORAL at 07:46

## 2018-06-09 RX ADMIN — Medication 0.25 MILLIGRAM(S): at 16:31

## 2018-06-09 RX ADMIN — HYDROMORPHONE HYDROCHLORIDE 1 MILLIGRAM(S): 2 INJECTION INTRAMUSCULAR; INTRAVENOUS; SUBCUTANEOUS at 08:15

## 2018-06-09 RX ADMIN — HYDROMORPHONE HYDROCHLORIDE 1 MILLIGRAM(S): 2 INJECTION INTRAMUSCULAR; INTRAVENOUS; SUBCUTANEOUS at 23:35

## 2018-06-09 RX ADMIN — Medication 1 TABLET(S): at 23:09

## 2018-06-09 RX ADMIN — HYDROMORPHONE HYDROCHLORIDE 1 MILLIGRAM(S): 2 INJECTION INTRAMUSCULAR; INTRAVENOUS; SUBCUTANEOUS at 04:28

## 2018-06-09 RX ADMIN — HYDROMORPHONE HYDROCHLORIDE 1 MILLIGRAM(S): 2 INJECTION INTRAMUSCULAR; INTRAVENOUS; SUBCUTANEOUS at 21:02

## 2018-06-09 RX ADMIN — Medication 4 MILLIGRAM(S): at 12:10

## 2018-06-09 RX ADMIN — HYDROMORPHONE HYDROCHLORIDE 1 MILLIGRAM(S): 2 INJECTION INTRAMUSCULAR; INTRAVENOUS; SUBCUTANEOUS at 20:32

## 2018-06-09 RX ADMIN — HYDROMORPHONE HYDROCHLORIDE 1 MILLIGRAM(S): 2 INJECTION INTRAMUSCULAR; INTRAVENOUS; SUBCUTANEOUS at 07:39

## 2018-06-09 RX ADMIN — HYDROMORPHONE HYDROCHLORIDE 1 MILLIGRAM(S): 2 INJECTION INTRAMUSCULAR; INTRAVENOUS; SUBCUTANEOUS at 16:23

## 2018-06-09 RX ADMIN — GABAPENTIN 600 MILLIGRAM(S): 400 CAPSULE ORAL at 23:09

## 2018-06-09 RX ADMIN — OXYCODONE AND ACETAMINOPHEN 2 TABLET(S): 5; 325 TABLET ORAL at 00:29

## 2018-06-09 RX ADMIN — HYDROMORPHONE HYDROCHLORIDE 1 MILLIGRAM(S): 2 INJECTION INTRAMUSCULAR; INTRAVENOUS; SUBCUTANEOUS at 12:05

## 2018-06-09 RX ADMIN — PANTOPRAZOLE SODIUM 40 MILLIGRAM(S): 20 TABLET, DELAYED RELEASE ORAL at 08:08

## 2018-06-09 RX ADMIN — Medication 325 MILLIGRAM(S): at 12:10

## 2018-06-09 RX ADMIN — Medication 1 APPLICATION(S): at 19:04

## 2018-06-09 RX ADMIN — HYDROMORPHONE HYDROCHLORIDE 1 MILLIGRAM(S): 2 INJECTION INTRAMUSCULAR; INTRAVENOUS; SUBCUTANEOUS at 03:20

## 2018-06-09 RX ADMIN — SIMETHICONE 80 MILLIGRAM(S): 80 TABLET, CHEWABLE ORAL at 20:46

## 2018-06-09 RX ADMIN — Medication 2 SPRAY(S): at 23:10

## 2018-06-09 RX ADMIN — PANTOPRAZOLE SODIUM 40 MILLIGRAM(S): 20 TABLET, DELAYED RELEASE ORAL at 20:45

## 2018-06-09 NOTE — PROGRESS NOTE ADULT - ASSESSMENT
37yo patient on POD#2 status post  section. Hemodynamically stable. Patient has a h/o opioid addiction status post Psychiatry consult yesterday that was abruptly ended by patient when questioned about past h/o toxic habits. No evidence of acute psychiatric issues (please refer to consult for details).    Plan:  - routine pp care  - pending CBC 18:00  - pain management 37yo patient on POD#2 status post  section. Hemodynamically stable. Patient has a h/o opioid addiction status post Psychiatry consult yesterday that was abruptly ended by patient when questioned about past h/o toxic habits. No evidence of acute psychiatric issues (please refer to consult for details).    Plan:  - routine pp care  - pending CBC 18:00  - patient is Rh negative--- baby girl is Rh positive---- needs Rhogam IM  - pain management

## 2018-06-09 NOTE — CHART NOTE - NSCHARTNOTEFT_GEN_A_CORE
Called by RN for uncontrolled pain. Per RN pt received 2 Percocets at 10:30pm however she still complains of pain. Pt seen and examined at bedside. Pt states that the percocet did not bring her pain down at all. She reports a history of multiple abdominal surgeries and a back injury for which she is supposed to have surgery to repair. Previously on opiates for pain control which she stopped after finding out about her pregnancy. Admits to chronic cough/wheezing. Denies CP, SOB.    Vital Signs Last 24 Hrs  T(C): 36.6 (09 Jun 2018 00:29), Max: 36.9 (08 Jun 2018 20:43)  T(F): 97.8 (09 Jun 2018 00:29), Max: 98.4 (08 Jun 2018 20:43)  HR: 78 (09 Jun 2018 00:29) (67 - 83)  BP: 108/71 (09 Jun 2018 00:29) (107/73 - 124/84)  BP(mean): 81 (09 Jun 2018 00:29) (81 - 81)  RR: 16 (09 Jun 2018 00:29) (16 - 16)  SpO2: 100% (09 Jun 2018 00:29) (98% - 100%)    Heart: RRR, S1S2  Lungs: diffuse wheezing b/l  Abdomen: Soft, +TTP, dressing with small amount of blood visible under tape on R. side, dry and intact otherwise.  Extremities: 1+ pitting edema b/l LE, no TTP                          11.5   14.38 )-----------( 228      ( 07 Jun 2018 21:07 )             34.2     A/P:   Uncontrolled pain: Will d/c percocet and give 2mg PO Dilaudid q4 hours prn given patients medical history and history of tolerance to narcotics. Further pain management per primary team.  Wheezing: Pt encouraged to try nebulizer treatment however she refused.   Will follow.    Leila Kidd, PGY-2 Called by RN for uncontrolled pain. Per RN pt received 2 Percocets at 10:30pm however she still complains of pain. Pt seen and examined at bedside. Pt states that the percocet did not bring her pain down at all. She reports a history of multiple abdominal surgeries and a back injury for which she is supposed to have surgery to repair. Previously on opiates for pain control which she stopped after finding out about her pregnancy. Admits to chronic cough/wheezing. Denies CP, SOB.    Vital Signs Last 24 Hrs  T(C): 36.6 (09 Jun 2018 00:29), Max: 36.9 (08 Jun 2018 20:43)  T(F): 97.8 (09 Jun 2018 00:29), Max: 98.4 (08 Jun 2018 20:43)  HR: 78 (09 Jun 2018 00:29) (67 - 83)  BP: 108/71 (09 Jun 2018 00:29) (107/73 - 124/84)  BP(mean): 81 (09 Jun 2018 00:29) (81 - 81)  RR: 16 (09 Jun 2018 00:29) (16 - 16)  SpO2: 100% (09 Jun 2018 00:29) (98% - 100%)    Heart: RRR, S1S2  Lungs: diffuse wheezing b/l  Abdomen: Soft, +TTP, dressing with small amount of blood visible under tape on R. side, dry and intact otherwise.  Extremities: 1+ pitting edema b/l LE, no TTP                          11.5   14.38 )-----------( 228      ( 07 Jun 2018 21:07 )             34.2     A/P:   Uncontrolled pain: Will d/c percocet and give 2mg PO Dilaudid q4 hours prn given patients medical history and history of tolerance to narcotics. Pt reported she is not allergic to Dilaudid, but rather Toradol or Tramadol (pt is unsure which) Further pain management per primary team.  Wheezing: Pt encouraged to try nebulizer treatment however she refused.   Will follow.    Leila Kidd, PGY-2 Called by RN for uncontrolled pain. Per RN pt received 2 Percocets at 10:30pm however she still complains of pain. Pt seen and examined at bedside. Pt states that the percocet did not bring her pain down at all. She reports a history of multiple abdominal surgeries and a back injury for which she is supposed to have surgery to repair. Previously on opiates for pain control which she stopped after finding out about her pregnancy. Admits to chronic cough/wheezing. Denies CP, SOB.    Vital Signs Last 24 Hrs  T(C): 36.6 (09 Jun 2018 00:29), Max: 36.9 (08 Jun 2018 20:43)  T(F): 97.8 (09 Jun 2018 00:29), Max: 98.4 (08 Jun 2018 20:43)  HR: 78 (09 Jun 2018 00:29) (67 - 83)  BP: 108/71 (09 Jun 2018 00:29) (107/73 - 124/84)  BP(mean): 81 (09 Jun 2018 00:29) (81 - 81)  RR: 16 (09 Jun 2018 00:29) (16 - 16)  SpO2: 100% (09 Jun 2018 00:29) (98% - 100%)    Heart: RRR, S1S2  Lungs: diffuse wheezing b/l  Abdomen: Soft, +TTP, dressing with small amount of blood visible under tape on R. side, dry and intact otherwise.  Extremities: 1+ pitting edema b/l LE, no TTP                          11.5   14.38 )-----------( 228      ( 07 Jun 2018 21:07 )             34.2     A/P:   Uncontrolled pain: Will d/c percocet and give 2mg PO Dilaudid q4 hours prn given patients medical history and history of tolerance to narcotics. Pt reported she is not allergic to Dilaudid, but rather Toradol or Tramadol (pt is unsure which) Further pain management per primary team.  Wheezing: Pt encouraged to try nebulizer treatment however she refused.   Will follow.    Leila Kidd, PGY-2    Addendum: 1:55am  Pharmacy is out of Dilaudid, and patient reports now to RN that she is allergic to morphine (pruritis). After lengthy discussion with pharmacist regarding postpartum pain control, lactation safety, and patients intolerances/allergies, will give 50mcg IM fentanyl q2 hours prn x2 doses. Further pain management as per primary team.    Leila Kidd, PGY-2 Called by RN for uncontrolled pain. Per RN pt received 2 Percocets at 10:30pm however she still complains of pain. Pt seen and examined at bedside. Pt states that the percocet did not bring her pain down at all. She reports a history of multiple abdominal surgeries and a back injury for which she is supposed to have surgery to repair. Previously on opiates for pain control which she stopped after finding out about her pregnancy. Admits to chronic cough/wheezing. Denies CP, SOB.    Vital Signs Last 24 Hrs  T(C): 36.6 (09 Jun 2018 00:29), Max: 36.9 (08 Jun 2018 20:43)  T(F): 97.8 (09 Jun 2018 00:29), Max: 98.4 (08 Jun 2018 20:43)  HR: 78 (09 Jun 2018 00:29) (67 - 83)  BP: 108/71 (09 Jun 2018 00:29) (107/73 - 124/84)  BP(mean): 81 (09 Jun 2018 00:29) (81 - 81)  RR: 16 (09 Jun 2018 00:29) (16 - 16)  SpO2: 100% (09 Jun 2018 00:29) (98% - 100%)    Heart: RRR, S1S2  Lungs: diffuse wheezing b/l  Abdomen: Soft, +TTP, dressing with small amount of blood visible under tape on R. side, dry and intact otherwise.  Extremities: 1+ pitting edema b/l LE, no TTP                          11.5   14.38 )-----------( 228      ( 07 Jun 2018 21:07 )             34.2     A/P:   Uncontrolled pain: Will d/c percocet and give 2mg PO Dilaudid q4 hours prn given patients medical history and history of tolerance to narcotics. Pt reported she is not allergic to Dilaudid, but rather Toradol or Tramadol (pt is unsure which) Further pain management per primary team.  Wheezing: Pt encouraged to try nebulizer treatment however she refused.   Will follow.    Leila Kidd, PGY-2    Addendum: 1:55am  Pharmacy is out of Dilaudid, and patient reports now to RN that she is allergic to morphine (pruritis). After lengthy discussion with pharmacist regarding postpartum pain control, lactation safety, and patients intolerances/allergies, will give 50mcg IM fentanyl q2 hours prn x2 doses. Further pain management as per primary team.    Leila Kidd, PGY-2    Addendum: 3:03am  Notified by RN that pharmacy located a vial of Dilaudid. Will give 1mg SQ q4 hours prn.     Leila Kidd, PGY-2

## 2018-06-09 NOTE — PROGRESS NOTE ADULT - SUBJECTIVE AND OBJECTIVE BOX
37yo patient on POD#2 status post  section complaining of postop pain. Patient reports pain improved after fentanyl was administered. She has a past history of opioid addiction, back surgery and CPP. Tolerating diet and ambulation without difficulties. Normal lochia.      Vital Signs Last 24 Hrs  T(C): 36.6 (2018 07:30), Max: 36.9 (2018 20:43)  T(F): 97.9 (2018 07:30), Max: 98.4 (2018 20:43)  HR: 79 (2018 07:30) (76 - 83)  BP: 112/78 (2018 07:30) (107/73 - 124/84)  BP(mean): 81 (2018 00:29) (81 - 81)  RR: 16 (2018 07:30) (16 - 16)  SpO2: 100% (2018 07:30) (98% - 100%)                 Physical exam  Gen- AAO x 3  Abd- S&D, c/d/i incision   Pelvic- normal lochia  Ext- +2 b/l swelling without tenderness, negative Veronique's sign, no erythema

## 2018-06-09 NOTE — CHART NOTE - NSCHARTNOTEFT_GEN_A_CORE
Called by RN for uncontrolled pain. Pt seen and examined at bedside. Pt states that her pain is uncontrolled with the SQ Dilaudid she received 2 hours ago. She was informed that due to the shortage that there is a limited number of doses remaining of the SQ Dilaudid and once it runs out she will be switched to PO (which RN confirmed that pharmacy now has). She was offered the ordered PO Xanax and declined as she knows she would need to refrain from breastfeeding while taking it. She has agreed to receive her next dose 1 hour early (11:30pm) and to also receive her due Gabapentin dose. After that she understands that she will be switched to the PO form. Will continue to follow.    Leila Kidd, PGY-2

## 2018-06-10 ENCOUNTER — TRANSCRIPTION ENCOUNTER (OUTPATIENT)
Age: 38
End: 2018-06-10

## 2018-06-10 VITALS
TEMPERATURE: 98 F | OXYGEN SATURATION: 100 % | SYSTOLIC BLOOD PRESSURE: 102 MMHG | DIASTOLIC BLOOD PRESSURE: 73 MMHG | HEART RATE: 77 BPM | RESPIRATION RATE: 16 BRPM

## 2018-06-10 RX ORDER — ACETAMINOPHEN 500 MG
2 TABLET ORAL
Qty: 0 | Refills: 0 | COMMUNITY
Start: 2018-06-10

## 2018-06-10 RX ORDER — OXYCODONE HYDROCHLORIDE 5 MG/1
1 TABLET ORAL
Qty: 20 | Refills: 0 | OUTPATIENT
Start: 2018-06-10 | End: 2018-06-12

## 2018-06-10 RX ADMIN — HYDROMORPHONE HYDROCHLORIDE 2 MILLIGRAM(S): 2 INJECTION INTRAMUSCULAR; INTRAVENOUS; SUBCUTANEOUS at 04:48

## 2018-06-10 RX ADMIN — SIMETHICONE 80 MILLIGRAM(S): 80 TABLET, CHEWABLE ORAL at 03:49

## 2018-06-10 RX ADMIN — PANTOPRAZOLE SODIUM 40 MILLIGRAM(S): 20 TABLET, DELAYED RELEASE ORAL at 08:12

## 2018-06-10 RX ADMIN — HYDROMORPHONE HYDROCHLORIDE 2 MILLIGRAM(S): 2 INJECTION INTRAMUSCULAR; INTRAVENOUS; SUBCUTANEOUS at 03:53

## 2018-06-10 RX ADMIN — HYDROMORPHONE HYDROCHLORIDE 2 MILLIGRAM(S): 2 INJECTION INTRAMUSCULAR; INTRAVENOUS; SUBCUTANEOUS at 08:12

## 2018-06-10 RX ADMIN — PREGABALIN 100 MICROGRAM(S): 225 CAPSULE ORAL at 00:40

## 2018-06-10 RX ADMIN — Medication 1 APPLICATION(S): at 00:41

## 2018-06-10 RX ADMIN — Medication 500 MILLIGRAM(S): at 00:40

## 2018-06-10 RX ADMIN — GABAPENTIN 600 MILLIGRAM(S): 400 CAPSULE ORAL at 06:27

## 2018-06-10 RX ADMIN — HYDROMORPHONE HYDROCHLORIDE 1 MILLIGRAM(S): 2 INJECTION INTRAMUSCULAR; INTRAVENOUS; SUBCUTANEOUS at 00:05

## 2018-06-10 NOTE — DISCHARGE NOTE OB - CARE PLAN
Principal Discharge DX:	 delivery delivered  Goal:	Healthy baby, healthy mom  Assessment and plan of treatment:	- Follow up with your primary MD within two weeks for an incision check  - Return to your home medication regiment for all other medical issues  - Take medications as prescribed

## 2018-06-10 NOTE — DISCHARGE NOTE OB - PLAN OF CARE
Healthy baby, healthy mom - Follow up with your primary MD within two weeks for an incision check  - Return to your home medication regiment for all other medical issues  - Take medications as prescribed

## 2018-06-10 NOTE — DISCHARGE NOTE OB - MEDICATION SUMMARY - MEDICATIONS TO TAKE
I will START or STAY ON the medications listed below when I get home from the hospital:    predniSONE 20 mg oral tablet  -- 1 tab(s) by mouth 2 times a day  -- It is very important that you take or use this exactly as directed.  Do not skip doses or discontinue unless directed by your doctor.  Obtain medical advice before taking any non-prescription drugs as some may affect the action of this medication.  Take with food or milk.    -- Indication: For Steroid    rizatriptan 10 mg oral tablet  -- 1 tab(s) by mouth once a day  -- Indication: For Migrain    methadone 10 mg oral tablet  -- 1 tab(s) by mouth every 12 hours  -- Indication: For Opiod addiction    acetaminophen 325 mg oral tablet  -- 2 tab(s) by mouth every 6 hours, As needed, For Temp greater than 38.5 C (101.3 F)  -- Indication: For Fever or pain    acetaminophen-oxycodone 325 mg-5 mg oral tablet  -- 1 tab(s) by mouth every 4 hours, As needed, Moderate Pain (4 - 6) MDD:6 tabs per day  -- Indication: For Pain    aluminum hydroxide-magnesium hydroxide 200 mg-200 mg/5 mL oral suspension  -- 30 milliliter(s) by mouth every 6 hours, As needed, Dyspepsia  -- Indication: For Upset stomach    propranolol 20 mg oral tablet  -- 1 tab(s) by mouth 2 times a day  -- Indication: For HTN    gabapentin 400 mg oral capsule  -- 1 cap(s) by mouth 4 times a day  -- Indication: For Neuropathy    venlafaxine 37.5 mg oral tablet  -- 1 tab(s) by mouth once a day  -- Indication: For Depression    Zofran ODT 4 mg oral tablet, disintegrating  -- 1 tab(s) by mouth every 6 hours, As Needed  -- Indication: For Nausea    Zofran ODT 4 mg oral tablet, disintegrating  -- 1 tab(s) by mouth 3 times a day  -- Indication: For Nausea    benzonatate 100 mg oral capsule  -- 1 cap(s) by mouth 2 times a day  -- May cause drowsiness.  Alcohol may intensify this effect.  Use care when operating dangerous machinery.  Swallow whole.  Do not crush.    -- Indication: For Cough    albuterol 2.5 mg/3 mL (0.083%) inhalation solution  -- 3 milliliter(s) inhaled 3 times a day  -- For inhalation only.  It is very important that you take or use this exactly as directed.  Do not skip doses or discontinue unless directed by your doctor.  Obtain medical advice before taking any non-prescription drugs as some may affect the action of this medication.    -- Indication: For Asthma    ProAir HFA 90 mcg/inh inhalation aerosol  -- 1 puff(s) inhaled 3 times a day  -- For inhalation only.  It is very important that you take or use this exactly as directed.  Do not skip doses or discontinue unless directed by your doctor.  Obtain medical advice before taking any non-prescription drugs as some may affect the action of this medication.  Shake well before use.    -- Indication: For Asthma    Lidoderm 5% topical film  -- Apply on skin to affected area every 12 hours  -- Indication: For Pain    Prenatal Multivitamins with Folic Acid 1 mg oral tablet  -- 1 tab(s) by mouth once a day  -- Indication: For Health maintenance    Carafate 1 g oral tablet  -- 1 tab(s) by mouth 4 times a day (before meals and at bedtime)  -- Do not take dairy products, antacids, or iron preparations within one hour of this medication.  It is very important that you take or use this exactly as directed.  Do not skip doses or discontinue unless directed by your doctor.  Take medication on an empty stomach 1 hour before or 2 to 3 hours after a meal unless otherwise directed by your doctor.    -- Indication: For Antacid    cyclobenzaprine 10 mg oral tablet  -- 2 tab(s) by mouth once a day (at bedtime)  -- Indication: For Back pain    cyclobenzaprine 10 mg oral tablet  -- 1 tab(s) by mouth 2 times a day, As needed, Muscle Spasm  -- Indication: For Back spasm    pantoprazole 40 mg oral delayed release tablet  -- 1 tab(s) by mouth 2 times a day (before meals)  -- Indication: For Antacid    Isis 0.35 mg oral tablet  -- 1 tab(s) by mouth once a day  -- Indication: For Hormone

## 2018-06-10 NOTE — DISCHARGE NOTE OB - OTHER SIGNIFICANT FINDINGS
Complete Blood Count + Automated Diff (06.09.18 @ 17:56)    WBC Count: 14.63 K/uL    RBC Count: 3.40 M/uL    Hemoglobin: 11.9 g/dL    Hematocrit: 35.3 %    Mean Cell Volume: 103.8 fl    Mean Cell Hemoglobin: 35.0 pg    Mean Cell Hemoglobin Conc: 33.7 gm/dL    Red Cell Distrib Width: 14.8 %    Platelet Count - Automated: 250 K/uL    Auto Neutrophil #: 11.07 K/uL    Auto Lymphocyte #: 2.15 K/uL    Auto Monocyte #: 1.25 K/uL    Auto Eosinophil #: 0.07 K/uL    Auto Basophil #: 0.04 K/uL    Auto Neutrophil %: 75.7: Differential percentages must be correlated with absolute numbers for  clinical significance. %    Auto Lymphocyte %: 14.7 %    Auto Monocyte %: 8.5 %    Auto Eosinophil %: 0.5 %    Auto Basophil %: 0.3 %    Auto Immature Granulocyte %: 0.3 %

## 2018-06-10 NOTE — PROGRESS NOTE ADULT - ASSESSMENT
37yo Female POD#3: . Patient is doing well and has no acute compliants. Pain is mananged well with medications. Patient is tolerating regular diet. Patient is OOB, and urinating w/o any difficulities. ....Passing flatus without difficulty. Patient is breast/bottle feeding w/o any complications.     RH Status   Rubella Status:     CV: normal S1 and S2, rate and rhythm   Lungs: clear b/l   Abdo: Fundus firm w/ or w/o tenderness?  Incision: Bandages clean and intact   PV: no calf tenderness, edema     Vital Signs Last 24 Hrs  T(C): 36.6 (10 Idris 2018 07:30), Max: 36.7 (09 Jun 2018 19:42)  T(F): 97.9 (10 Idris 2018 07:30), Max: 98 (09 Jun 2018 19:42)  HR: 77 (10 Idris 2018 07:30) (77 - 83)  BP: 102/73 (10 Idris 2018 07:30) (102/73 - 130/79)  BP(mean): --  RR: 16 (10 Idris 2018 07:30) (16 - 16)  SpO2: 100% (10 Idris 2018 07:30) (97% - 100%)    CBC Full  -  ( 09 Jun 2018 17:56 )  WBC Count : 14.63 K/uL  Hemoglobin : 11.9 g/dL  Hematocrit : 35.3 %  Platelet Count - Automated : 250 K/uL  Mean Cell Volume : 103.8 fl  Mean Cell Hemoglobin : 35.0 pg  Mean Cell Hemoglobin Concentration : 33.7 gm/dL  Auto Neutrophil # : 11.07 K/uL  Auto Lymphocyte # : 2.15 K/uL  Auto Monocyte # : 1.25 K/uL  Auto Eosinophil # : 0.07 K/uL  Auto Basophil # : 0.04 K/uL  Auto Neutrophil % : 75.7 %  Auto Lymphocyte % : 14.7 %  Auto Monocyte % : 8.5 %  Auto Eosinophil % : 0.5 %  Auto Basophil % : 0.3 %

## 2018-06-10 NOTE — DISCHARGE NOTE OB - CARE PROVIDER_API CALL
[facility],   [address], [place] [address]  Phone: [phone]  Fax: (   )    -

## 2018-06-10 NOTE — PROGRESS NOTE ADULT - SUBJECTIVE AND OBJECTIVE BOX
Doing well, offers no complaints.  Reports mild swelling lower ext and mons.  Discussed.  Recommend rest and hydration.

## 2018-06-10 NOTE — DISCHARGE NOTE OB - PROVIDER TOKENS
FREE:[LAST:[[facility]],PHONE:[phone]],FAX:[(   [phone]    -],ADDRESS:[[place] [address]]]

## 2018-06-10 NOTE — DISCHARGE NOTE OB - HOSPITAL COURSE
38F S/P Csec without complications. During this admission, psychiatry was consulted for irritation that was abruptly ended by patient when questioned about past h/o toxic habits. No evidence of acute psychiatric issues.    PE  Vital Signs Last 24 Hrs  T(C): 36.6 (10 Idris 2018 07:30), Max: 36.7 (09 Jun 2018 19:42)  T(F): 97.9 (10 Idris 2018 07:30), Max: 98 (09 Jun 2018 19:42)  HR: 77 (10 Idris 2018 07:30) (77 - 83)  BP: 102/73 (10 Idris 2018 07:30) (102/73 - 130/79)  BP(mean): --  RR: 16 (10 Idris 2018 07:30) (16 - 16)  SpO2: 100% (10 Idris 2018 07:30) (97% - 100%)    Constitutional: Pt lying in bed, awake and alert, NAD  HEENT: EOMI, normal hearing, moist mucous membranes  Neck: Soft and supple, no JVD  Respiratory: CTABL, No wheezing, rales or rhonchi  Cardiovascular: S1S2+, RRR, no M/G/R  GI/: Fundus firm. BS+, soft, NT/ND, no guarding, no rebound  Extremities: No peripheral edema  Vascular: 2+ peripheral pulses  Neurological: AAOx3, no focal deficits  Musculoskeletal: 5/5 strength b/l upper and lower extremities  Skin: Incision C/D/I

## 2018-06-10 NOTE — DISCHARGE NOTE OB - PATIENT PORTAL LINK FT
You can access the [facility], offered by [facility], by registering with the following website: [email or web address]

## 2018-06-11 ENCOUNTER — APPOINTMENT (OUTPATIENT)
Dept: ANTEPARTUM | Facility: CLINIC | Age: 38
End: 2018-06-11

## 2018-06-13 ENCOUNTER — APPOINTMENT (OUTPATIENT)
Dept: MATERNAL FETAL MEDICINE | Facility: CLINIC | Age: 38
End: 2018-06-13

## 2018-06-13 ENCOUNTER — APPOINTMENT (OUTPATIENT)
Dept: ANTEPARTUM | Facility: CLINIC | Age: 38
End: 2018-06-13

## 2018-06-13 LAB — SURGICAL PATHOLOGY FINAL REPORT - CH: SIGNIFICANT CHANGE UP

## 2018-06-18 ENCOUNTER — APPOINTMENT (OUTPATIENT)
Dept: ANTEPARTUM | Facility: CLINIC | Age: 38
End: 2018-06-18

## 2018-06-18 ENCOUNTER — APPOINTMENT (OUTPATIENT)
Dept: MATERNAL FETAL MEDICINE | Facility: CLINIC | Age: 38
End: 2018-06-18

## 2018-06-18 DIAGNOSIS — Z30.2 ENCOUNTER FOR STERILIZATION: ICD-10-CM

## 2018-06-18 DIAGNOSIS — O09.513 SUPERVISION OF ELDERLY PRIMIGRAVIDA, THIRD TRIMESTER: ICD-10-CM

## 2018-06-18 DIAGNOSIS — Z3A.36 36 WEEKS GESTATION OF PREGNANCY: ICD-10-CM

## 2018-06-18 DIAGNOSIS — Z79.4 LONG TERM (CURRENT) USE OF INSULIN: ICD-10-CM

## 2018-06-25 ENCOUNTER — APPOINTMENT (OUTPATIENT)
Dept: ANTEPARTUM | Facility: CLINIC | Age: 38
End: 2018-06-25

## 2018-07-17 ENCOUNTER — APPOINTMENT (OUTPATIENT)
Dept: PULMONOLOGY | Facility: CLINIC | Age: 38
End: 2018-07-17
Payer: COMMERCIAL

## 2018-07-17 VITALS
DIASTOLIC BLOOD PRESSURE: 62 MMHG | OXYGEN SATURATION: 97 % | HEIGHT: 65 IN | BODY MASS INDEX: 22.33 KG/M2 | SYSTOLIC BLOOD PRESSURE: 118 MMHG | HEART RATE: 88 BPM | WEIGHT: 134 LBS

## 2018-07-17 PROCEDURE — 99214 OFFICE O/P EST MOD 30 MIN: CPT

## 2018-07-26 PROBLEM — Z80.0 FAMILY HISTORY OF COLON CANCER: Status: ACTIVE | Noted: 2017-12-06

## 2018-08-23 NOTE — ED ADULT NURSE NOTE - PMH
good air movement/respirations non-labored/breath sounds equal
Alcohol abuse    Asthma  last attack 2 YEARS AGO  Chronic back pain    Intussusception intestine  2013  Other type of migraine    Palpitations  pvc's  Type 2 diabetes mellitus without complication

## 2018-11-12 ENCOUNTER — APPOINTMENT (OUTPATIENT)
Dept: PULMONOLOGY | Facility: CLINIC | Age: 38
End: 2018-11-12
Payer: COMMERCIAL

## 2018-11-12 VITALS
OXYGEN SATURATION: 98 % | HEART RATE: 78 BPM | BODY MASS INDEX: 21.47 KG/M2 | WEIGHT: 129 LBS | DIASTOLIC BLOOD PRESSURE: 62 MMHG | SYSTOLIC BLOOD PRESSURE: 108 MMHG

## 2018-11-12 PROCEDURE — 99214 OFFICE O/P EST MOD 30 MIN: CPT

## 2018-11-12 RX ORDER — BUPROPION HYDROCHLORIDE 300 MG/1
300 TABLET, EXTENDED RELEASE ORAL
Refills: 0 | Status: ACTIVE | COMMUNITY

## 2018-11-12 RX ORDER — LEVALBUTEROL HYDROCHLORIDE 1.25 MG/3ML
1.25 SOLUTION RESPIRATORY (INHALATION) EVERY 6 HOURS
Qty: 1 | Refills: 5 | Status: DISCONTINUED | COMMUNITY
Start: 2018-03-21 | End: 2018-11-12

## 2018-11-12 RX ORDER — PREDNISONE 10 MG/1
10 TABLET ORAL
Qty: 30 | Refills: 3 | Status: DISCONTINUED | COMMUNITY
Start: 2018-04-24 | End: 2018-11-12

## 2018-11-12 RX ORDER — BUDESONIDE 180 UG/1
180 AEROSOL, POWDER RESPIRATORY (INHALATION) DAILY
Qty: 3 | Refills: 1 | Status: DISCONTINUED | COMMUNITY
Start: 2018-03-21 | End: 2018-11-12

## 2018-11-12 RX ORDER — FLUTICASONE PROPIONATE 50 UG/1
50 SPRAY, METERED NASAL DAILY
Qty: 1 | Refills: 5 | Status: DISCONTINUED | COMMUNITY
Start: 2018-03-21 | End: 2018-11-12

## 2018-11-12 RX ORDER — SYRINGE-NEEDLE,INSULIN,0.5 ML 31 GX5/16"
31G X 5/16" SYRINGE, EMPTY DISPOSABLE MISCELLANEOUS
Refills: 0 | Status: ACTIVE | COMMUNITY
Start: 2018-05-16

## 2018-11-12 RX ORDER — LEVALBUTEROL 1.25 MG/.5ML
1.25 SOLUTION, CONCENTRATE RESPIRATORY (INHALATION)
Qty: 225 | Refills: 0 | Status: DISCONTINUED | COMMUNITY
Start: 2018-04-24 | End: 2018-11-12

## 2018-11-12 RX ORDER — MONTELUKAST 10 MG/1
10 TABLET, FILM COATED ORAL
Qty: 90 | Refills: 3 | Status: DISCONTINUED | COMMUNITY
Start: 2018-04-24 | End: 2018-11-12

## 2018-11-12 RX ORDER — BUDESONIDE 180 UG/1
180 AEROSOL, POWDER RESPIRATORY (INHALATION) DAILY
Qty: 3 | Refills: 1 | Status: DISCONTINUED | COMMUNITY
Start: 2018-04-24 | End: 2018-11-12

## 2018-11-12 RX ORDER — FLUTICASONE PROPIONATE 50 UG/1
50 SPRAY, METERED NASAL DAILY
Qty: 1 | Refills: 3 | Status: DISCONTINUED | COMMUNITY
Start: 2018-04-24 | End: 2018-11-12

## 2018-11-12 RX ORDER — PREDNISONE 10 MG/1
10 TABLET ORAL
Qty: 30 | Refills: 3 | Status: DISCONTINUED | COMMUNITY
Start: 2018-05-24 | End: 2018-11-12

## 2018-11-12 RX ORDER — GABAPENTIN 300 MG/1
300 CAPSULE ORAL EVERY 6 HOURS
Refills: 0 | Status: ACTIVE | COMMUNITY
Start: 2017-12-06

## 2018-11-12 RX ORDER — MONTELUKAST 10 MG/1
10 TABLET, FILM COATED ORAL
Qty: 90 | Refills: 1 | Status: DISCONTINUED | COMMUNITY
Start: 2018-03-21 | End: 2018-11-12

## 2019-02-19 ENCOUNTER — APPOINTMENT (OUTPATIENT)
Dept: PULMONOLOGY | Facility: CLINIC | Age: 39
End: 2019-02-19

## 2019-05-06 NOTE — PACU DISCHARGE NOTE - PAIN:
Problem: Occupational Therapy Goal  Goal: Occupational Therapy Goal  Goals to be met by: 5/13/19    Patient will increase functional independence with ADLs by performing:    Grooming while standing at sink with Set-up Assistance.  Toileting from toilet with Supervision for hygiene and clothing management.   Supine to sit with [place].  Toilet transfer to toilet with Stand-by Assistance.    Outcome: Ongoing (interventions implemented as appropriate)  Evaluation completed and POC established.    MARY Duong           Controlled with current regime

## 2019-07-29 NOTE — ED PROVIDER NOTE - CROS ED ROS STATEMENT
DISCHARGE SUMMARY from Nurse    PATIENT INSTRUCTIONS:    After general anesthesia or intravenous sedation, for 24 hours or while taking prescription Narcotics:  · Limit your activities  · Do not drive and operate hazardous machinery  · Do not make important personal or business decisions  · Do  not drink alcoholic beverages  · If you have not urinated within 8 hours after discharge, please contact your surgeon on call. Report the following to your surgeon:  · Excessive pain, swelling, redness or odor of or around the surgical area  · Temperature over 100.5  · Nausea and vomiting lasting longer than 4 hours or if unable to take medications  · Any signs of decreased circulation or nerve impairment to extremity: change in color, persistent  numbness, tingling, coldness or increase pain  · Any questions    What to do at Home:  Nick Holcomb IN 10 DAYS CALL FOR APPT      If you experience any of the following symptoms heavy bleeding, fevers, severe pain, circulation changes, please follow up with dr Kay Chaparro    *  Please give a list of your current medications to your Primary Care Provider. *  Please update this list whenever your medications are discontinued, doses are      changed, or new medications (including over-the-counter products) are added. *  Please carry medication information at all times in case of emergency situations. These are general instructions for a healthy lifestyle:    No smoking/ No tobacco products/ Avoid exposure to second hand smoke  Surgeon General's Warning:  Quitting smoking now greatly reduces serious risk to your health.     Obesity, smoking, and sedentary lifestyle greatly increases your risk for illness    A healthy diet, regular physical exercise & weight monitoring are important for maintaining a healthy lifestyle    You may be retaining fluid if you have a history of heart failure or if you experience any of the following symptoms:  Weight gain of 3 pounds or more overnight or 5 pounds in a week, increased swelling in our hands or feet or shortness of breath while lying flat in bed. Please call your doctor as soon as you notice any of these symptoms; do not wait until your next office visit. The discharge information has been reviewed with the patient and caregiver. The patient and caregiver verbalized understanding. Discharge medications reviewed with the patient and caregiver and appropriate educational materials and side effects teaching were provided. ___________________________________________________________________________________________________________________________________[facility]  Dr. Leora Daniels Instructions for Spinal Cord Stimulator placement    Diet:  1. Begin with liquids and light foods such as Jell-O and soups. 2. Advance as tolerated to your regular diet if not nauseated. First 24 hours:  1. Be in the care of a responsible adult. 2. Do not drive or operate machinery. 3. Do not drink alcoholic beverages. Wound Care:  1. Maintain your postoperative dressing for 72 hours then remove pad and replace with gauze. Then change dressing daily. 2.  A home health nurse will come to your house to help you with dressing changes. 2. Keep the surgical incisions dry until follow-up. Medications:  1. Strong oral narcotic pain medications have been prescribed for the first few days. Use only as directed. No pain medication is capable of taking away all the pain. Taking your pills at regular intervals will give you the best chance of having less pain. 2. If you need a refill PLEASE PLAN AHEAD. Call our office during regular hours (8-5). 3. Do not combine with alcoholic beverages. 4. Be careful as you walk, climb stairs or drive as mild dizziness is not unusual.  5. Do not take medications that have not been prescribed by your surgeon.   6. You may switch to over the counter pain medication of your choice as you become more comfortable. WHEN TO CALL YOUR SURGEON:  1. Unrelenting pain  2. Fever or Chills  3. Redness around incisions  4. Color change in foot or toes  5. Continuous drainage or bleeding from wounds (a small amount of drainage is expected)  6. Any other worrisome condition    WHEN TO CALL YOUR REGULAR DOCTOR:  1. Flare up of any of your regular medical conditions    WHEN TO CALL 911:  1. Chest Pain  2. Shortness of Breath  3. Any other acute serious condition    CALL THE OFFICE:  If you have severe pain unrelieved by the medications; If you have a fever of 101.0°F or greater; If you notice excessive swelling, redness, or persistent drainage from the incision or IV site; [facility] office number is [phone] from 8:00am to 5:00pm Monday through Friday. After 5:00pm, on weekends, or holidays, please leave a message with our answering service and the doctor on-call will get back to you shortly.       Patient armband removed and shredded all other ROS negative except as per HPI

## 2019-09-28 ENCOUNTER — EMERGENCY (EMERGENCY)
Facility: HOSPITAL | Age: 39
LOS: 1 days | Discharge: HOME | End: 2019-09-28
Attending: EMERGENCY MEDICINE
Payer: MEDICAID

## 2019-09-28 VITALS
WEIGHT: 145.06 LBS | RESPIRATION RATE: 10 BRPM | OXYGEN SATURATION: 98 % | SYSTOLIC BLOOD PRESSURE: 95 MMHG | TEMPERATURE: 98 F | DIASTOLIC BLOOD PRESSURE: 51 MMHG | HEART RATE: 72 BPM | HEIGHT: 66 IN

## 2019-09-28 DIAGNOSIS — M16.9 OSTEOARTHRITIS OF HIP, UNSPECIFIED: Chronic | ICD-10-CM

## 2019-09-28 DIAGNOSIS — Z98.82 BREAST IMPLANT STATUS: Chronic | ICD-10-CM

## 2019-09-28 DIAGNOSIS — Z98.84 BARIATRIC SURGERY STATUS: Chronic | ICD-10-CM

## 2019-09-28 PROCEDURE — 99284 EMERGENCY DEPT VISIT MOD MDM: CPT

## 2019-09-28 PROCEDURE — 99284 EMERGENCY DEPT VISIT MOD MDM: CPT | Mod: 25

## 2019-09-28 RX ORDER — NALOXONE HYDROCHLORIDE 4 MG/.1ML
2 SPRAY NASAL ONCE
Refills: 0 | Status: COMPLETED | OUTPATIENT
Start: 2019-09-28 | End: 2019-09-28

## 2019-09-28 RX ADMIN — NALOXONE HYDROCHLORIDE 2 MILLIGRAM(S): 4 SPRAY NASAL at 22:25

## 2019-09-28 NOTE — ED PROVIDER NOTE - PATIENT PORTAL LINK FT
You can access the FollowMyHealth Patient Portal offered by [facility] by registering at the following website: [email or web address]. By joining [facility]’s FollowMyHealth portal, you will also be able to view your health information using other applications (apps) compatible with our system.

## 2019-09-28 NOTE — ED ADULT NURSE REASSESSMENT NOTE - NS ED NURSE REASSESS COMMENT FT1
pt. responded well to narcan. ambulating in ED w/o complication. aggressive and yelling in triage, requesting to leave. MD at bedside with patient. AMA forms signed by  as well as Narcan kit provided and teach back from  on proper used successful

## 2019-09-28 NOTE — ED PROVIDER NOTE - OBJECTIVE STATEMENT
39y old with history of substance use, presents with unresponsive nessm, was drinking alcohol, took 3 oxycodone as per patient, brought I by  who knows that patient takes substancem an dfound her unresponsive, he drove her in    recd narcain 4mg

## 2019-09-28 NOTE — ED PROVIDER NOTE - PROGRESS NOTE DETAILS
racing thoughts  unable to neal in ed, gave nora to aníbal, taught his hw to use it, patient leaving ama

## 2019-09-28 NOTE — ED PROVIDER NOTE - CLINICAL SUMMARY MEDICAL DECISION MAKING FREE TEXT BOX
39 y old from home with overdose, plan to give narcan, observe, patient wants to leavem  signing her out  patient mbulatory

## 2019-09-28 NOTE — ED ADULT NURSE NOTE - CHIEF COMPLAINT QUOTE
carried in through ambulance doors by , informed patient took oxycodone and alcohol, shallow respirations in triage, minimally responded to tactile stimulation. MD called to bedside for eval

## 2019-09-28 NOTE — ED PROVIDER NOTE - PHYSICAL EXAMINATION
Constitutional : Patient was abtunded  recd narcan, after narcan  Appears uncomfortably, talking in one to two words  Head :NC AT , no swelling  Eyes :eomi, no swelling  Mouth :mm moist, no tongue bites  Neck : supple, trachea in midline  Chest :Elias air entry, symm chest expansion, no distress  Heart :S1 S2 distant  Abdomen :abd soft, non tender  Musc/Skel :ext no swelling, no deformity, no spine tenderness, distal pulses present  Neuro  :AAO 3 no focal deficits

## 2019-09-28 NOTE — CHART NOTE - NSCHARTNOTEFT_GEN_A_CORE
ABG was ordered by DR. Major. PT refused to get any blood drawn. PT nurse advised ordering  and were told to hold off ABG for now.

## 2019-09-30 ENCOUNTER — TRANSCRIPTION ENCOUNTER (OUTPATIENT)
Age: 39
End: 2019-09-30

## 2020-12-30 ENCOUNTER — EMERGENCY (EMERGENCY)
Facility: HOSPITAL | Age: 40
LOS: 1 days | Discharge: HOME | End: 2020-12-30
Attending: EMERGENCY MEDICINE
Payer: COMMERCIAL

## 2020-12-30 VITALS
DIASTOLIC BLOOD PRESSURE: 75 MMHG | WEIGHT: 145.06 LBS | RESPIRATION RATE: 18 BRPM | HEIGHT: 66 IN | SYSTOLIC BLOOD PRESSURE: 108 MMHG | TEMPERATURE: 99 F | HEART RATE: 80 BPM | OXYGEN SATURATION: 97 %

## 2020-12-30 DIAGNOSIS — Z98.82 BREAST IMPLANT STATUS: Chronic | ICD-10-CM

## 2020-12-30 DIAGNOSIS — Z98.84 BARIATRIC SURGERY STATUS: Chronic | ICD-10-CM

## 2020-12-30 DIAGNOSIS — M16.9 OSTEOARTHRITIS OF HIP, UNSPECIFIED: Chronic | ICD-10-CM

## 2020-12-30 PROCEDURE — 99284 EMERGENCY DEPT VISIT MOD MDM: CPT

## 2020-12-30 PROCEDURE — 93010 ELECTROCARDIOGRAM REPORT: CPT

## 2020-12-30 PROCEDURE — 99283 EMERGENCY DEPT VISIT LOW MDM: CPT

## 2020-12-30 PROCEDURE — U0003: CPT

## 2020-12-30 PROCEDURE — 87631 RESP VIRUS 3-5 TARGETS: CPT

## 2020-12-30 PROCEDURE — 93005 ELECTROCARDIOGRAM TRACING: CPT

## 2020-12-30 NOTE — ED ADULT TRIAGE NOTE - CHIEF COMPLAINT QUOTE
pt stated she has sore throat, fever body aches and chest pains. Stated she was exposed to covid recently.

## 2020-12-30 NOTE — ED STATDOCS - ENMT, MLM
(+)tonsillar swelling, Nasal mucosa clear.  Mouth with normal mucosa  Throat has no vesicles, no oropharyngeal exudates and uvula is midline.

## 2020-12-30 NOTE — ED STATDOCS - PATIENT PORTAL LINK FT
You can access the FollowMyHealth Patient Portal offered by [facility] by registering at the following website: [email or web address]. By joining [facility]’s FollowMyHealth portal, you will also be able to view your health information using other applications (apps) compatible with our system.

## 2020-12-30 NOTE — ED ADULT TRIAGE NOTE - BP NONINVASIVE SYSTOLIC (MM HG)
A (Catheter Bln [place] 35 7mm 80mm 135cm Rpd Dfl Gw) balloon was inflated in the left proximal popliteal artery. The balloon was inflated at 6 malini for 12 seconds at 5/2/2018 7:49 AM.  The balloon was used for 2nd inflation at 6 malini for 10 seconds.   108

## 2020-12-30 NOTE — ED STATDOCS - OBJECTIVE STATEMENT
40y pt with pmhx of alcohol abuse, migraines, diabetes presents to ED c/o flu like symptoms. Pt states she feels a little better now than she did when she first came in. Pt states she has two lesions on her brain after a recent CT. Pt states she began to feel a little achy, and states she had a close contact with covid patient. Pt states she began to feel very achy and has been having chest pains when laying down. Pt states she has been having a fever as well as sore throat with difficulty swallowing. 40y pt with pmhx of migraines, diabetes presents to ED c/o flu like symptoms. Pt states she began to feel a little achy, and states she had a close contact with covid patient. Pt states she began to feel very achy and has been having chest pains when laying down. Pt states she has been having a fever as well as sore throat with difficulty swallowing. Patient recently in hospital for both her own care at [facility], as well as her son, and has had exposure to covid

## 2021-06-20 ENCOUNTER — EMERGENCY (EMERGENCY)
Facility: HOSPITAL | Age: 41
LOS: 1 days | Discharge: HOME | End: 2021-06-20
Attending: EMERGENCY MEDICINE
Payer: MEDICAID

## 2021-06-20 VITALS
TEMPERATURE: 98 F | WEIGHT: 160.06 LBS | HEIGHT: 66 IN | HEART RATE: 103 BPM | RESPIRATION RATE: 26 BRPM | OXYGEN SATURATION: 99 % | DIASTOLIC BLOOD PRESSURE: 69 MMHG | SYSTOLIC BLOOD PRESSURE: 109 MMHG

## 2021-06-20 VITALS — HEART RATE: 96 BPM | RESPIRATION RATE: 18 BRPM

## 2021-06-20 DIAGNOSIS — Z98.82 BREAST IMPLANT STATUS: Chronic | ICD-10-CM

## 2021-06-20 DIAGNOSIS — Z98.84 BARIATRIC SURGERY STATUS: Chronic | ICD-10-CM

## 2021-06-20 DIAGNOSIS — M16.9 OSTEOARTHRITIS OF HIP, UNSPECIFIED: Chronic | ICD-10-CM

## 2021-06-20 LAB
APPEARANCE UR: CLEAR — SIGNIFICANT CHANGE UP
BACTERIA # UR AUTO: ABNORMAL
BILIRUB UR-MCNC: NEGATIVE — SIGNIFICANT CHANGE UP
COLOR SPEC: YELLOW — SIGNIFICANT CHANGE UP
DIFF PNL FLD: NEGATIVE — SIGNIFICANT CHANGE UP
EPI CELLS # UR: SIGNIFICANT CHANGE UP
GLUCOSE UR QL: NEGATIVE MG/DL — SIGNIFICANT CHANGE UP
HCG UR QL: NEGATIVE — SIGNIFICANT CHANGE UP
KETONES UR-MCNC: ABNORMAL
LEUKOCYTE ESTERASE UR-ACNC: ABNORMAL
NITRITE UR-MCNC: NEGATIVE — SIGNIFICANT CHANGE UP
PH UR: 5 — SIGNIFICANT CHANGE UP (ref 5–8)
PROT UR-MCNC: NEGATIVE MG/DL — SIGNIFICANT CHANGE UP
RBC CASTS # UR COMP ASSIST: SIGNIFICANT CHANGE UP /HPF (ref 0–4)
SP GR SPEC: 1.02 — SIGNIFICANT CHANGE UP (ref 1.01–1.02)
UROBILINOGEN FLD QL: NEGATIVE MG/DL — SIGNIFICANT CHANGE UP
WBC UR QL: SIGNIFICANT CHANGE UP

## 2021-06-20 PROCEDURE — 94640 AIRWAY INHALATION TREATMENT: CPT

## 2021-06-20 PROCEDURE — 81025 URINE PREGNANCY TEST: CPT

## 2021-06-20 PROCEDURE — 81001 URINALYSIS AUTO W/SCOPE: CPT

## 2021-06-20 PROCEDURE — 99284 EMERGENCY DEPT VISIT MOD MDM: CPT

## 2021-06-20 PROCEDURE — 99283 EMERGENCY DEPT VISIT LOW MDM: CPT

## 2021-06-20 RX ORDER — ALBUTEROL 90 UG/1
2 AEROSOL, METERED ORAL ONCE
Refills: 0 | Status: COMPLETED | OUTPATIENT
Start: 2021-06-20 | End: 2021-06-20

## 2021-06-20 RX ORDER — OXYCODONE AND ACETAMINOPHEN 5; 325 MG/1; MG/1
1 TABLET ORAL ONCE
Refills: 0 | Status: DISCONTINUED | OUTPATIENT
Start: 2021-06-20 | End: 2021-06-20

## 2021-06-20 RX ADMIN — ALBUTEROL 2 PUFF(S): 90 AEROSOL, METERED ORAL at 22:01

## 2021-06-20 RX ADMIN — OXYCODONE AND ACETAMINOPHEN 1 TABLET(S): 5; 325 TABLET ORAL at 22:01

## 2021-06-20 RX ADMIN — OXYCODONE AND ACETAMINOPHEN 1 TABLET(S): 5; 325 TABLET ORAL at 22:32

## 2021-06-20 NOTE — ED PROVIDER NOTE - PROGRESS NOTE DETAILS
Chitra: pt doesn't want to wait for ct scan. no hematuria. satting well. understands risks of missed trauma by not having ct and risks coming with that, will send short course pain control, has incentive inspirometer and instructed to use it and return precautions/f/u.

## 2021-06-20 NOTE — ED PROVIDER NOTE - PHYSICAL EXAMINATION
Gen: No acute distress, non toxic  HEENT: Mucous membranes moist, pink conjunctivae, EOMI  CV: RRR, nl s1/s2.  Resp: breathing ~20, occsaional wheeze.   GI: Abdomen soft, NT, ND. No rebound, no guarding  : No CVAT  Neuro: A&O x 3, moving all 4 extremities  MSK: ttp along left posterior coastal margin, no bruising  Skin: No rashes. intact and perfused.

## 2021-06-20 NOTE — ED ADULT TRIAGE NOTE - CHIEF COMPLAINT QUOTE
worsening shortness of breath s/p breaking 3 ribs 3 days ago. pt speaking clear full sentences. states she is wheezing and pain during coughing

## 2021-06-20 NOTE — ED PROVIDER NOTE - OBJECTIVE STATEMENT
42 y/o female hx etoh abuse, chronic back pain, depression c/o continued left pain s/p rib fracture 8 9 10 on left 2 days ago after falling down steps. Went to urgent care, had xr, and told had microscopic hematuria. Pt was told to get ct but never came to ED. C/o sob and wheezing. Denies gross hematuria, no abd pain. No headache/neck pain or other injury. Denies pregnancy. Take percocet and gabapentin for pain. No f/c.     ROS: No fever/chills. No eye pain/changes in vision, No ear pain/sore throat/dysphagia, No chest pain/palpitations.  No abdominal pain, N/V/D, no black/bloody bm. No dysuria/frequency/discharge, No headache. No Dizziness.    No rashes or breaks in skin. No numbness/tingling/weakness.

## 2021-06-20 NOTE — ED PROVIDER NOTE - NSFOLLOWUPINSTRUCTIONS_ED_ALL_ED_FT
Take tylenol/motrin over the counter as indicated for pain. Take percocet only for breakthrough pain, no driving/heavy machinery/important decisions when taking percocet as it can impair judgement and can be addictive so do not take more than necessary. Use incentive spirometer.     Rib Fracture(s)    A rib fracture is a break or crack in one of the bones of the ribs. The ribs are a group of long, curved bones that wrap around your chest and attach to your spine. A broken or cracked rib is often painful, but most do not cause other problems and heal on their own over time. Symptoms include pain when you breath or cough or pain when pressing over the area. Breathing exercises will help keep your lungs inflated and prevent lung collapse or infection.    SEEK IMMEDIATE MEDICAL CARE IF YOU HAVE ANY OF THE FOLLOWING SYMPTOMS: fever, shortness of breath, coughing up blood, abdominal pain, nausea or vomiting, pain not controlled with medications.

## 2021-06-20 NOTE — ED PROVIDER NOTE - PATIENT PORTAL LINK FT
You can access the FollowMyHealth Patient Portal offered by [facility] by registering at the following website: [email or web address]. By joining [facility]’s FollowMyHealth portal, you will also be able to view your health information using other applications (apps) compatible with our system.

## 2021-06-20 NOTE — ED PROVIDER NOTE - CLINICAL SUMMARY MEDICAL DECISION MAKING FREE TEXT BOX
rib fractures, with sob/mld wheezing. has incentive spirometer at home, microscopic hematuria at urgent care when happened, no gross hematuria. urine, ct, pain control, albuterol, reassess

## 2021-12-06 NOTE — ED PROVIDER NOTE - CPE EDP GASTRO NORM
Remain off of duloxetine  Continue other medications  Continue physical therapy for back pain  Repeat fasting labs in April 2022  
normal...

## 2022-01-13 ENCOUNTER — APPOINTMENT (OUTPATIENT)
Dept: PULMONOLOGY | Facility: CLINIC | Age: 42
End: 2022-01-13
Payer: MEDICAID

## 2022-01-13 VITALS
SYSTOLIC BLOOD PRESSURE: 100 MMHG | WEIGHT: 195 LBS | DIASTOLIC BLOOD PRESSURE: 68 MMHG | HEART RATE: 98 BPM | OXYGEN SATURATION: 97 % | BODY MASS INDEX: 32.45 KG/M2

## 2022-01-13 PROCEDURE — 99203 OFFICE O/P NEW LOW 30 MIN: CPT | Mod: 25

## 2022-01-13 PROCEDURE — 99406 BEHAV CHNG SMOKING 3-10 MIN: CPT

## 2022-01-13 RX ORDER — ESCITALOPRAM OXALATE 20 MG/1
20 TABLET, FILM COATED ORAL
Refills: 0 | Status: ACTIVE | COMMUNITY

## 2022-01-13 RX ORDER — INSULIN HUMAN 100 [IU]/ML
100 INJECTION, SUSPENSION SUBCUTANEOUS
Qty: 1 | Refills: 0 | Status: DISCONTINUED | COMMUNITY
Start: 2018-05-16 | End: 2022-01-13

## 2022-01-13 RX ORDER — FOLIC ACID 1 MG/1
1 TABLET ORAL DAILY
Refills: 0 | Status: DISCONTINUED | COMMUNITY
Start: 2017-12-06 | End: 2022-01-13

## 2022-01-13 RX ORDER — IRON 18 MG
90 (18 FE) TABLET ORAL DAILY
Refills: 0 | Status: DISCONTINUED | COMMUNITY
Start: 2018-02-13 | End: 2022-01-13

## 2022-01-13 RX ORDER — HYDROXYZINE PAMOATE 50 MG/1
50 CAPSULE ORAL
Refills: 0 | Status: ACTIVE | COMMUNITY

## 2022-01-13 RX ORDER — BUDESONIDE 180 UG/1
180 AEROSOL, POWDER RESPIRATORY (INHALATION)
Qty: 3 | Refills: 1 | Status: DISCONTINUED | COMMUNITY
Start: 2018-07-17 | End: 2022-01-13

## 2022-01-13 RX ORDER — OMEPRAZOLE 40 MG/1
40 CAPSULE, DELAYED RELEASE ORAL TWICE DAILY
Refills: 0 | Status: DISCONTINUED | COMMUNITY
Start: 2018-02-13 | End: 2022-01-13

## 2022-01-13 RX ORDER — TRAZODONE HYDROCHLORIDE 100 MG/1
100 TABLET ORAL
Refills: 0 | Status: ACTIVE | COMMUNITY

## 2022-01-13 RX ORDER — FLUTICASONE PROPIONATE 50 UG/1
50 SPRAY, METERED NASAL DAILY
Qty: 3 | Refills: 3 | Status: DISCONTINUED | COMMUNITY
Start: 2018-07-17 | End: 2022-01-13

## 2022-01-13 RX ORDER — ONDANSETRON HYDROCHLORIDE 8 MG/1
8 TABLET, FILM COATED ORAL 3 TIMES DAILY
Refills: 0 | Status: DISCONTINUED | COMMUNITY
Start: 2017-12-06 | End: 2022-01-13

## 2022-01-13 RX ORDER — ARIPIPRAZOLE 10 MG/1
10 TABLET ORAL
Refills: 0 | Status: ACTIVE | COMMUNITY

## 2022-01-13 RX ORDER — MONTELUKAST 10 MG/1
10 TABLET, FILM COATED ORAL
Qty: 90 | Refills: 3 | Status: DISCONTINUED | COMMUNITY
Start: 2018-07-17 | End: 2022-01-13

## 2022-01-13 RX ORDER — LEVALBUTEROL HYDROCHLORIDE 1.25 MG/3ML
1.25 SOLUTION RESPIRATORY (INHALATION) EVERY 6 HOURS
Qty: 1080 | Refills: 5 | Status: DISCONTINUED | COMMUNITY
Start: 2018-07-17 | End: 2022-01-13

## 2022-01-13 NOTE — COUNSELING
[Cessation strategies including cessation program discussed] : Cessation strategies including cessation program discussed [Use of nicotine replacement therapies and other medications discussed] : Use of nicotine replacement therapies and other medications discussed [Encouraged to pick a quit date and identify support needed to quit] : Encouraged to pick a quit date and identify support needed to quit [Smoking Cessation Program Referral] : Smoking Cessation Program Referral  [Hazards of at-risk alcohol use discussed] : Hazards of at-risk alcohol use discussed [Strategies to reduce or eliminate alcohol use discussed] : Strategies to reduce or eliminate alcohol use discussed [Support options provided] : Support options provided [Potential consequences of obesity discussed] : Potential consequences of obesity discussed [Benefits of weight loss discussed] : Benefits of weight loss discussed [Structured Weight Management Program suggested:] : Structured weight management program suggested [Encouraged to maintain food diary] : Encouraged to maintain food diary [Encouraged to increase physical activity] : Encouraged to increase physical activity [Encouraged to use exercise tracking device] : Encouraged to use exercise tracking device

## 2022-01-13 NOTE — CONSULT LETTER
[Dear  ___] : Dear  [unfilled], [Consult Letter:] : I had the pleasure of evaluating your patient, [unfilled]. [Please see my note below.] : Please see my note below. [Consult Closing:] : Thank you very much for allowing me to participate in the care of this patient.  If you have any questions, please do not hesitate to contact me. [Sincerely,] : Sincerely, [Freddy Hagen MD] : Freddy Hagen MD

## 2022-01-13 NOTE — PHYSICAL EXAM
[General Appearance - Well Developed] : well developed [General Appearance - Well Nourished] : well nourished [Normal Conjunctiva] : the conjunctiva exhibited no abnormalities [Neck Appearance] : the appearance of the neck was normal [Neck Cervical Mass (___cm)] : no neck mass was observed [Jugular Venous Distention Increased] : there was no jugular-venous distention [Thyroid Diffuse Enlargement] : the thyroid was not enlarged [Heart Sounds] : normal S1 and S2 [Arterial Pulses Normal] : the arterial pulses were normal [Edema] : no peripheral edema present [Respiration, Rhythm And Depth] : normal respiratory rhythm and effort [Auscultation Breath Sounds / Voice Sounds] : lungs were clear to auscultation bilaterally [Lungs Percussion] : the lungs were normal to percussion [Bowel Sounds] : normal bowel sounds [Abdomen Soft] : soft [Abdomen Tenderness] : non-tender [Abnormal Walk] : normal gait [Nail Clubbing] : no clubbing of the fingernails [Cyanosis, Localized] : no localized cyanosis [No Focal Deficits] : no focal deficits [Oriented To Time, Place, And Person] : oriented to person, place, and time [Impaired Insight] : insight and judgment were intact [Affect] : the affect was normal [Memory Recent] : recent memory was not impaired [Skin Turgor] : normal skin turgor [] : no rash [FreeTextEntry1] : Cobblestoning of posterior pharynx

## 2022-01-13 NOTE — HISTORY OF PRESENT ILLNESS
[FreeTextEntry1] : 11/12/18\par 37 yo pregnant female smoker with episodic dyspnea\par Last seen by me for bariatric clearance in 2011 at which time lung function was normal\par Now says she has sinus complaints, persistent wheezing and seasonal "asthmatic bronchitis"\par Denies heartburn\par No allergies.\par Intolerant to albuterol.  Prefers xopenex\par \par 4/24/18\par Better\par Still wheezing with am cough productive or clear sputum\par \par 5/24/18\par Uncomfortable\par Sleeps best with truncal elevation\par Less wheezing\par \par 11/12/18\par Doing well\par Smoking less\par On Wellbutrin to help with smoke cessation \par Breast feeding \par Asthmatic bronchitis around X-mass frequently\par \par 1/13/22\par Drinking ETOH\par Weight gain\par Wheezing\par Wants same Rx as in the past \par

## 2022-01-14 RX ORDER — FLUTICASONE FUROATE 200 UG/1
200 POWDER RESPIRATORY (INHALATION) DAILY
Qty: 1 | Refills: 5 | Status: ACTIVE | COMMUNITY
Start: 2022-01-14 | End: 1900-01-01

## 2022-01-14 RX ORDER — BUDESONIDE 180 UG/1
180 AEROSOL, POWDER RESPIRATORY (INHALATION)
Qty: 3 | Refills: 3 | Status: DISCONTINUED | COMMUNITY
Start: 2018-11-12 | End: 2022-01-14

## 2022-03-11 NOTE — PATIENT PROFILE ADULT. - BILL OF RIGHTS/ADMISSION INFORMATION PROVIDED TO:
[FreeTextEntry1] : Patient with above \par \par \par prior hx morbid obesity s/p bariatric surgery ( lap band )with weight loss   encourage to follow diet restriction , and exercise \par \par HTN  hypertensive heart disease : continue low salt diet and medication  hyzaar ,  bystolic   would obtain echocardiogram to assess ventricular function \par \par Mild Hyperlipidemia : continue diet restriction   will obtain blood work , \par \par Mild chronic LOW  WBC : patient did have hematologic evaluation  , now normal on recent blood work \par \par follow up after 6 months 
Patient

## 2022-03-16 NOTE — PROVIDER CONTACT NOTE (OTHER) - RECOMMENDATIONS
Findings discussed with patient in detail. Pt will closely monitor symptoms for any change and understands the importance of prompt outpatient follow up and will return if worse.    
straight cath pt
IVL pt
test pt blood sugar and have orders put in for sliding scale on EMAR

## 2022-03-31 ENCOUNTER — APPOINTMENT (OUTPATIENT)
Dept: PULMONOLOGY | Facility: CLINIC | Age: 42
End: 2022-03-31
Payer: MEDICAID

## 2022-03-31 VITALS
HEIGHT: 65 IN | BODY MASS INDEX: 33.15 KG/M2 | DIASTOLIC BLOOD PRESSURE: 62 MMHG | WEIGHT: 199 LBS | HEART RATE: 72 BPM | SYSTOLIC BLOOD PRESSURE: 102 MMHG | OXYGEN SATURATION: 98 % | RESPIRATION RATE: 16 BRPM

## 2022-03-31 PROCEDURE — 99213 OFFICE O/P EST LOW 20 MIN: CPT

## 2022-07-26 ENCOUNTER — APPOINTMENT (OUTPATIENT)
Dept: PULMONOLOGY | Facility: CLINIC | Age: 42
End: 2022-07-26

## 2023-01-03 ENCOUNTER — APPOINTMENT (OUTPATIENT)
Dept: PULMONOLOGY | Facility: CLINIC | Age: 43
End: 2023-01-03
Payer: MEDICAID

## 2023-01-03 VITALS
HEART RATE: 81 BPM | RESPIRATION RATE: 16 BRPM | WEIGHT: 180 LBS | OXYGEN SATURATION: 95 % | BODY MASS INDEX: 29.99 KG/M2 | SYSTOLIC BLOOD PRESSURE: 116 MMHG | DIASTOLIC BLOOD PRESSURE: 62 MMHG | HEIGHT: 65 IN

## 2023-01-03 DIAGNOSIS — F17.200 NICOTINE DEPENDENCE, UNSPECIFIED, UNCOMPLICATED: ICD-10-CM

## 2023-01-03 DIAGNOSIS — K21.9 GASTRO-ESOPHAGEAL REFLUX DISEASE W/OUT ESOPHAGITIS: ICD-10-CM

## 2023-01-03 DIAGNOSIS — J44.9 CHRONIC OBSTRUCTIVE PULMONARY DISEASE, UNSPECIFIED: ICD-10-CM

## 2023-01-03 DIAGNOSIS — J30.9 ALLERGIC RHINITIS, UNSPECIFIED: ICD-10-CM

## 2023-01-03 PROCEDURE — 99406 BEHAV CHNG SMOKING 3-10 MIN: CPT

## 2023-01-03 PROCEDURE — 96372 THER/PROPH/DIAG INJ SC/IM: CPT

## 2023-01-03 PROCEDURE — 99213 OFFICE O/P EST LOW 20 MIN: CPT | Mod: 25

## 2023-01-03 RX ORDER — LEVALBUTEROL HYDROCHLORIDE 1.25 MG/3ML
1.25 SOLUTION RESPIRATORY (INHALATION)
Qty: 180 | Refills: 5 | Status: ACTIVE | COMMUNITY
Start: 2023-01-03 | End: 1900-01-01

## 2023-01-03 RX ORDER — MONTELUKAST 10 MG/1
10 TABLET, FILM COATED ORAL
Qty: 1 | Refills: 2 | Status: ACTIVE | COMMUNITY
Start: 2023-01-03 | End: 1900-01-01

## 2023-01-03 RX ORDER — FLUTICASONE FUROATE, UMECLIDINIUM BROMIDE AND VILANTEROL TRIFENATATE 100; 62.5; 25 UG/1; UG/1; UG/1
100-62.5-25 POWDER RESPIRATORY (INHALATION)
Qty: 3 | Refills: 3 | Status: ACTIVE | COMMUNITY
Start: 2023-01-03 | End: 1900-01-01

## 2023-01-03 RX ORDER — BUDESONIDE, GLYCOPYRROLATE, AND FORMOTEROL FUMARATE 160; 9; 4.8 UG/1; UG/1; UG/1
160-9-4.8 AEROSOL, METERED RESPIRATORY (INHALATION) TWICE DAILY
Qty: 3 | Refills: 1 | Status: DISCONTINUED | COMMUNITY
Start: 2023-01-03 | End: 2023-01-03

## 2023-01-03 RX ORDER — PREDNISONE 10 MG/1
10 TABLET ORAL
Qty: 30 | Refills: 0 | Status: ACTIVE | COMMUNITY
Start: 2023-01-03 | End: 1900-01-01

## 2023-01-03 RX ORDER — DEXAMETHASONE SODIUM PHOSPHATE 4 MG/ML
4 INJECTION, SOLUTION INTRAMUSCULAR; INTRAVENOUS
Qty: 0 | Refills: 0 | Status: COMPLETED | OUTPATIENT
Start: 2023-01-03

## 2023-01-03 RX ORDER — OMEPRAZOLE 40 MG/1
40 CAPSULE, DELAYED RELEASE ORAL
Qty: 90 | Refills: 3 | Status: ACTIVE | COMMUNITY
Start: 2023-01-03 | End: 1900-01-01

## 2023-01-03 RX ORDER — FLUTICASONE PROPIONATE 50 UG/1
50 SPRAY, METERED NASAL DAILY
Qty: 1 | Refills: 5 | Status: ACTIVE | COMMUNITY
Start: 2023-01-03 | End: 1900-01-01

## 2023-01-03 RX ADMIN — DEXAMETHASONE SODIUM PHOSPHATE 0 MG/ML: 4 INJECTION, SOLUTION INTRAMUSCULAR; INTRAVENOUS at 00:00

## 2023-01-03 NOTE — HISTORY OF PRESENT ILLNESS
[FreeTextEntry1] : 11/12/18\par 37 yo pregnant female smoker with episodic dyspnea\par Last seen by me for bariatric clearance in 2011 at which time lung function was normal\par Now says she has sinus complaints, persistent wheezing and seasonal "asthmatic bronchitis"\par Denies heartburn\par No allergies.\par Intolerant to albuterol.  Prefers xopenex\par \par 4/24/18\par Better\par Still wheezing with am cough productive or clear sputum\par \par 5/24/18\par Uncomfortable\par Sleeps best with truncal elevation\par Less wheezing\par \par 11/12/18\par Doing well\par Smoking less\par On Wellbutrin to help with smoke cessation \par Breast feeding \par Asthmatic bronchitis around X-mass frequently\par \par 1/13/22\par Drinking ETOH\par Weight gain\par Wheezing\par Wants same Rx as in the past \par \par 3/31/22\par MOCTEZUMA\par Inspiratory dyspnea\par Recumbent wheeze \par

## 2023-01-04 RX ORDER — MONTELUKAST 10 MG/1
10 TABLET, FILM COATED ORAL
Qty: 90 | Refills: 3 | Status: COMPLETED | COMMUNITY
Start: 2018-11-12 | End: 2023-01-04

## 2023-01-04 RX ORDER — OMEPRAZOLE 40 MG/1
40 CAPSULE, DELAYED RELEASE ORAL
Qty: 90 | Refills: 3 | Status: COMPLETED | COMMUNITY
Start: 2018-07-17 | End: 2023-01-04

## 2023-01-04 RX ORDER — FLUTICASONE PROPIONATE 50 UG/1
50 SPRAY, METERED NASAL DAILY
Qty: 3 | Refills: 3 | Status: COMPLETED | COMMUNITY
Start: 2018-11-12 | End: 2023-01-04

## 2023-01-04 RX ORDER — LEVALBUTEROL HYDROCHLORIDE 1.25 MG/3ML
1.25 SOLUTION RESPIRATORY (INHALATION)
Qty: 1080 | Refills: 3 | Status: COMPLETED | COMMUNITY
Start: 2018-11-12 | End: 2023-01-04

## 2023-01-16 ENCOUNTER — APPOINTMENT (OUTPATIENT)
Dept: PULMONOLOGY | Facility: CLINIC | Age: 43
End: 2023-01-16
Payer: MEDICAID

## 2023-01-16 VITALS — HEIGHT: 64 IN | WEIGHT: 186 LBS | BODY MASS INDEX: 31.76 KG/M2

## 2023-01-16 DIAGNOSIS — J45.909 UNSPECIFIED ASTHMA, UNCOMPLICATED: ICD-10-CM

## 2023-01-16 PROCEDURE — 85018 HEMOGLOBIN: CPT | Mod: QW

## 2023-01-16 PROCEDURE — 94729 DIFFUSING CAPACITY: CPT

## 2023-01-16 PROCEDURE — 94727 GAS DIL/WSHOT DETER LNG VOL: CPT

## 2023-01-16 PROCEDURE — 94010 BREATHING CAPACITY TEST: CPT

## 2023-01-17 RX ORDER — NICOTINE TRANSDERMAL SYSTEM 21 MG/24H
21 PATCH, EXTENDED RELEASE TRANSDERMAL
Qty: 30 | Refills: 1 | Status: ACTIVE | COMMUNITY
Start: 2023-01-17 | End: 1900-01-01

## 2023-01-17 NOTE — HISTORY OF PRESENT ILLNESS
[FreeTextEntry1] : 11/12/18\par 37 yo pregnant female smoker with episodic dyspnea\par Last seen by me for bariatric clearance in 2011 at which time lung function was normal\par Now says she has sinus complaints, persistent wheezing and seasonal "asthmatic bronchitis"\par Denies heartburn\par No allergies.\par Intolerant to albuterol.  Prefers xopenex\par \par 4/24/18\par Better\par Still wheezing with am cough productive or clear sputum\par \par 5/24/18\par Uncomfortable\par Sleeps best with truncal elevation\par Less wheezing\par \par 11/12/18\par Doing well\par Smoking less\par On Wellbutrin to help with smoke cessation \par Breast feeding \par Asthmatic bronchitis around X-mass frequently\par \par 1/13/22\par Drinking ETOH\par Weight gain\par Wheezing\par Wants same Rx as in the past \par \par 3/31/22\par MOCTEZUMA\par Inspiratory dyspnea\par Recumbent wheeze \par \par 1/3/23\par Constant expiratory wheeze and MOCTEZUMA despite prednisone over [place]\par Still smoking\par No longer drinking or using drugs\par 4 children - youngest is 4\par  Introduction Text (Please End With A Colon): The following procedure was deferred: Detail Level: Detailed

## 2023-02-15 ENCOUNTER — APPOINTMENT (OUTPATIENT)
Dept: PULMONOLOGY | Facility: CLINIC | Age: 43
End: 2023-02-15

## 2023-03-16 ENCOUNTER — APPOINTMENT (OUTPATIENT)
Dept: ORTHOPEDIC SURGERY | Facility: CLINIC | Age: 43
End: 2023-03-16
Payer: MEDICAID

## 2023-03-16 VITALS
DIASTOLIC BLOOD PRESSURE: 77 MMHG | BODY MASS INDEX: 32.44 KG/M2 | WEIGHT: 190 LBS | HEART RATE: 73 BPM | HEIGHT: 64 IN | TEMPERATURE: 98.1 F | SYSTOLIC BLOOD PRESSURE: 116 MMHG

## 2023-03-16 DIAGNOSIS — M54.2 CERVICALGIA: ICD-10-CM

## 2023-03-16 PROCEDURE — 99203 OFFICE O/P NEW LOW 30 MIN: CPT

## 2023-03-16 PROCEDURE — 73030 X-RAY EXAM OF SHOULDER: CPT | Mod: LT

## 2023-03-16 RX ORDER — BACLOFEN 20 MG/1
20 TABLET ORAL 3 TIMES DAILY
Qty: 42 | Refills: 0 | Status: ACTIVE | COMMUNITY
Start: 2023-03-16 | End: 1900-01-01

## 2023-03-16 RX ORDER — TRAMADOL HYDROCHLORIDE 50 MG/1
50 TABLET, COATED ORAL
Qty: 28 | Refills: 0 | Status: ACTIVE | COMMUNITY
Start: 2023-03-16 | End: 1900-01-01

## 2023-03-16 NOTE — HISTORY OF PRESENT ILLNESS
[de-identified] : 03/16/2023 : ENEDINA LARA  is a 43 year  old female who presents to the office for evaluation of her neck and back pain today.  She states that she has had on and off neck and back pain that has gone on for years and she has done acupuncture, therapy in the past and taken medication in the past which has given some relief.  She states recently over the last month without any new acute traumatic injury she started having worsening pain again that is around her shoulder blade and neck and radiates down the arm and she gets intermittent tingling into the hand and fingers.  She states that she saw an Ortho urgent care who gave her baclofen and a steroid taper which gave her good relief while she was on the medication but upon finishing the medication 3 to 4 days ago the symptoms returned.  She states she has a lot of pain with neck movement and numbness and tingling into her hand is intermittent.  She states that the pain can be very severe and interferes with her activities.  She is here for evaluation today.  She has no other complaints today.

## 2023-03-16 NOTE — DISCUSSION/SUMMARY
[de-identified] : Assessment: Patient is a 43-year-old female with neck pain and left upper extremity radiculopathy\par \par Plan: I had a long discussion with the patient today regarding the nature of their diagnosis and treatment plan. We discussed the risks and benefits of no treatment as well as nonoperative and operative treatments.  I reviewed the x-rays of the shoulders had a revealed no acute bony pathology.  Shoulder exam is benign.  On examination the symptoms seem be originating from her neck and periscapular region.  At this time I am recommending conservative treatment including ice, heat, rest baclofen for symptomatic relief because she is unable to tolerate cyclobenzaprine.  She is unable to take oral NSAIDs due to gastric surgery in the past.  The prescription for tramadol was provided today since it is too soon to repeat a Medrol Dosepak.  I reviewed the risk and benefits of this medication and she is advised not to drive while taking tramadol or baclofen.  She was given a referral for physical therapy for her neck and shoulder today.  She was also provided with a referral to a spine specialist, Dr. Tran, for further assessment of her cervical radiculopathic symptoms.  Recommend follow-up in 8 weeks as needed for the shoulder.\par  \par The patient verbalizes their understanding and agrees with the plan.  All questions were answered to their satisfaction.

## 2023-03-16 NOTE — REVIEW OF SYSTEMS
[Negative] : Heme/Lymph [FreeTextEntry9] : Neck pain with left upper extremity numbness and tingling and radiating pain

## 2023-03-16 NOTE — PHYSICAL EXAM
[de-identified] : General:\par Awake, alert, no acute distress, Patient was cooperative and appropriate during the examination.\par \par The patient is of normal weight for height and age.\par \par Walks without an antalgic gait.\par \par Full, painless range of motion of the neck and back.\par \par Exam of the bilateral lower extremities is intact and symmetric with regards to dermatologic, vascular, and neurologic exam. Bilateral lower extremity sensation is grossly intact to light touch in the DP/SP/T/S/S nerve distributions. Intact DF/PF/EHL. BIlateral lower extremities warm and well-perfused with brisk capillary refill.\par \par \par Pulmonary:\par Regular, nonlabored breathing\par \par Abdomen:\par Soft, nontender, nondistended.\par \par Lymphatic:\par No evidence of axillary lymphadenopathy\par \par Left shoulder/Cervical Spine Exam:\par Physical exam of the shoulder demonstrates normal skin without signs of skin changes or abnormalities. No erythema, warmth, or joint effusion appreciated.  \par  \par Sensation intact light touch C5-T1\par Palpable radial pulse\par 2+ reflexes in the bilateral upper extremities.\par Fingers are freely mobile in all planes.\par Radial/ulnar/median/axillary/musculocutaneous/AIN/PIN nerves grossly intact\par  \par Shoulder Range of motion:\par Forward Flexion: 180\par Abduction: 150\par External Rotation: 45\par Internal Rotation: Mid lumbar level\par \par Cervical Range of motion:\par Forward Flexion: 30\par Right lateral bendin\par Left lateral bendin\par Right sided rotation: 50\par Left sided rotation: 60\par Extension: 15\par  \par Palpation:\par Not tender to palpation over the glenohumeral joint\par Not tender palpation over the rotator cuff insertion on the greater tuberosity\par Not tender to palpation over the AC joint\par Not tender to palpation of the biceps tendon/bicipital groove\par Moderately tender to palpation over the trapezial and paracervical, periscapular musculature\par No cervical midline tenderness\par  \par Strength testing:\par Supraspinatus: 5/5\par Infraspinatus: 5/5\par Subscapularis: 5/5\par  \par Special test:\par Spurling's test mildly positive\par Empty can test negative\par Elliott impingement test  negative\par Speeds test: Negative\par Littleton's test negative\par Lift-off test negative\par Bell-press test negative\par Cross-arm adduction test negative\par Spurling's test positive for reproducible pain, numbness, tingling down the left arm [de-identified] : X-ray 4 views of the left shoulder taken in the office today on 3/16/2023 reveals no acute fracture or dislocation.  No significant arthritis.

## 2023-03-16 NOTE — REASON FOR VISIT
[Initial Visit] : an initial visit for [Shoulder Pain] : shoulder pain [FreeTextEntry2] : left shoulder pain.

## 2023-03-30 ENCOUNTER — APPOINTMENT (OUTPATIENT)
Dept: ORTHOPEDIC SURGERY | Facility: CLINIC | Age: 43
End: 2023-03-30
Payer: MEDICAID

## 2023-03-30 VITALS
HEART RATE: 84 BPM | SYSTOLIC BLOOD PRESSURE: 111 MMHG | DIASTOLIC BLOOD PRESSURE: 75 MMHG | BODY MASS INDEX: 32.95 KG/M2 | HEIGHT: 64 IN | WEIGHT: 193 LBS

## 2023-03-30 PROCEDURE — 72040 X-RAY EXAM NECK SPINE 2-3 VW: CPT

## 2023-03-30 PROCEDURE — 99214 OFFICE O/P EST MOD 30 MIN: CPT

## 2023-03-30 RX ORDER — METHYLPREDNISOLONE 4 MG/1
4 TABLET ORAL
Qty: 1 | Refills: 0 | Status: ACTIVE | COMMUNITY
Start: 2023-03-30 | End: 1900-01-01

## 2023-03-30 NOTE — PHYSICAL EXAM
[Antalgic] : antalgic [de-identified] : CONSTITUTIONAL:  Patient is a very pleasant individual who is well-nourished and appears stated age. \par PSYCHIATRIC:  Alert and oriented times three and in no apparent distress, and participates with orthopedic evaluation well.\par HEAD:  Atraumatic and  nonsyndromic in appearance.\par EENT: No thyromegaly, EOMI.\par RESPIRATORY:  Respiratory rate is regular, not dyspneic on examination.\par LYMPHATICS:  There is no cervical or axillary lymphadenopathy.\par INTEGUMENTARY:  Skin is clean, dry, and intact about the bilateral upper extremities and cervical spine. \par VASCULAR:   There is brisk capillary refill about the bilateral upper extremities and radial pulses are 2/4. \par NEUROLOGIC: Grossly positive L'hirmitte, grossly positive Spurling's sign. There are no pathologic reflexes. There is a focal decrease in sensation of the left upper extremities on Wartenberg pinwheel examination within a very discrete C6 distribution.  Deep tendon reflexes are well-maintained at +2/4 of the bilateral upper extremities and are symmetric.\par MUSCULOSKELETAL:  There is no visible muscular atrophy.  Manual motor strength is well maintained in the bilateral upper extremities however left  strength and left triceps are diminished at approximately 3+/4/over 5.  Cervical range of motion is well maintained to approximately 50% cervical extension is at neutral secondary to a Spurling's and Lhermitte sign.  The patient ambulates in a non-myelopathic manner. Normal secondary orthopaedic exam of bilateral shoulders, elbows and hands.  Elbow flexion and extension, wrist extension, finger flexion and abduction are well maintained.\par \par   [de-identified] : X-rays of the cervical spine are taken on today's date shows mild loss of cervical lordosis disc bases are well-maintained no acute osseous abnormalities.  Today's date is March 30, 2023 2 views cervical spine obtained

## 2023-03-30 NOTE — DISCUSSION/SUMMARY
[de-identified] : Based upon worsening radiculopathy most worsening motor strength testing on the left upper extremity and recommending a cervical MRI patient is unable to get cervical MRI or any MRIs because she has implanted body jewelry.  Cervical CAT scan is been ordered as well as an oral Medrol Dosepak patient is going to follow-up after her cervical CAT scan to evaluate C5-C6 disc pathology herniated disc etc.

## 2023-03-30 NOTE — HISTORY OF PRESENT ILLNESS
[de-identified] : Is a very pleasant 43-year-old female presents for evaluation of cervical radiculopathy.  Patient states this is Shayna progressively getting worse over the last 2 months she has been to her primary care physician she is also been to a walk-in medical clinic she states great success with steroids and she states that immediately comes back after approximately 7 to 10 days she states the pain grossly gets worse when she looks up also pain improves down the arm with forward flexion.  Patient is unable to obtain an MRI because of implanted body jewelry.  She is also noticing weakness she notices weakness in her  strength also tricep strength with lifting her arm over her head etc. [Worsening] : worsening [10] : a maximum pain level of 10/10 [Constant] : ~He/She~ states the symptoms seem to be constant [Bending] : worsened by bending [Lifting] : worsened by lifting [Rest] : relieved by rest [Ataxia] : no ataxia [Incontinence] : no incontinence [Loss of Dexterity] : good dexterity [Urinary Ret.] : no urinary retention [de-identified] : Cervical extension [de-identified] : Cervical flexion arm abduction, steroids

## 2023-04-15 ENCOUNTER — APPOINTMENT (OUTPATIENT)
Dept: CT IMAGING | Facility: CLINIC | Age: 43
End: 2023-04-15
Payer: MEDICAID

## 2023-04-15 ENCOUNTER — OUTPATIENT (OUTPATIENT)
Dept: OUTPATIENT SERVICES | Facility: HOSPITAL | Age: 43
LOS: 1 days | End: 2023-04-15

## 2023-04-15 ENCOUNTER — RESULT REVIEW (OUTPATIENT)
Age: 43
End: 2023-04-15

## 2023-04-15 DIAGNOSIS — M54.12 RADICULOPATHY, CERVICAL REGION: ICD-10-CM

## 2023-04-15 DIAGNOSIS — Z98.82 BREAST IMPLANT STATUS: Chronic | ICD-10-CM

## 2023-04-15 DIAGNOSIS — Z98.84 BARIATRIC SURGERY STATUS: Chronic | ICD-10-CM

## 2023-04-15 DIAGNOSIS — M16.9 OSTEOARTHRITIS OF HIP, UNSPECIFIED: Chronic | ICD-10-CM

## 2023-04-15 PROCEDURE — 72125 CT NECK SPINE W/O DYE: CPT | Mod: 26

## 2023-04-15 PROCEDURE — 76376 3D RENDER W/INTRP POSTPROCES: CPT | Mod: 26

## 2023-04-15 PROCEDURE — 72131 CT LUMBAR SPINE W/O DYE: CPT | Mod: 26

## 2023-04-18 ENCOUNTER — NON-APPOINTMENT (OUTPATIENT)
Age: 43
End: 2023-04-18

## 2023-04-20 ENCOUNTER — APPOINTMENT (OUTPATIENT)
Dept: ORTHOPEDIC SURGERY | Facility: CLINIC | Age: 43
End: 2023-04-20
Payer: MEDICAID

## 2023-04-20 VITALS
HEART RATE: 75 BPM | SYSTOLIC BLOOD PRESSURE: 113 MMHG | WEIGHT: 193 LBS | BODY MASS INDEX: 32.95 KG/M2 | HEIGHT: 64 IN | DIASTOLIC BLOOD PRESSURE: 78 MMHG

## 2023-04-20 DIAGNOSIS — M47.812 SPONDYLOSIS W/OUT MYELOPATHY OR RADICULOPATHY, CERVICAL REGION: ICD-10-CM

## 2023-04-20 DIAGNOSIS — M54.12 RADICULOPATHY, CERVICAL REGION: ICD-10-CM

## 2023-04-20 PROCEDURE — 99214 OFFICE O/P EST MOD 30 MIN: CPT

## 2023-04-20 NOTE — HISTORY OF PRESENT ILLNESS
[de-identified] : Patient presents for evaluation of cervical and lumbar CAT scan data she states the left upper extremity radiculopathy is worsening.  She is on baclofen other medications if not controlling her pain and discomfort.  CAT scans are obtained she is unable to get MRIs because of permanent body jewelry.  Recommended taking the jewelry out or having it surgically removed for obtainment of MRIs for overall health and wellbeing [Worsening] : worsening [10] : a maximum pain level of 10/10 [Constant] : ~He/She~ states the symptoms seem to be constant [Bending] : worsened by bending [Lifting] : worsened by lifting [NSAIDs] : relieved by nonsteroidal anti-inflammatory drugs [Rest] : relieved by rest [Ataxia] : no ataxia [Incontinence] : no incontinence [Loss of Dexterity] : good dexterity [Urinary Ret.] : no urinary retention [de-identified] : Very minimal and transient

## 2023-04-20 NOTE — PHYSICAL EXAM
[de-identified] : CONSTITUTIONAL:  Patient is a very pleasant individual who is well-nourished and appears stated age.  Tearful and visibly upset\par PSYCHIATRIC:  Alert and oriented times three and in no apparent distress, and participates with orthopedic evaluation well.\par HEAD:  Atraumatic and  nonsyndromic in appearance.\par EENT: No thyromegaly, EOMI.\par RESPIRATORY:  Respiratory rate is regular, not dyspneic on examination.\par LYMPHATICS:  There is no cervical or axillary lymphadenopathy.\par INTEGUMENTARY:  Skin is clean, dry, and intact about the bilateral upper extremities and cervical spine. \par VASCULAR:   There is brisk capillary refill about the bilateral upper extremities and radial pulses are 2/4. \par NEUROLOGIC: Positive L'hirmitte, positive Spurling's sign. There are no pathologic reflexes. There is a decrease in sensation of the left greater than the right upper extremities on Wartenberg pinwheel examination and light touch consistent with C6 distribution.  Deep tendon reflexes are well-maintained at +2/4 of the bilateral upper extremities and are symmetric.\par MUSCULOSKELETAL:  There is no visible muscular atrophy.  Manual motor strength is well maintained in the bilateral upper extremities, however secondary to pain and discomfort left upper extremity is globally diminished I believe secondary to mainly apprehension.  Cervical range of motion is well maintained, to approximately 50% secondary to pain and apprehension.  The patient ambulates in a non-myelopathic manner. Normal secondary orthopaedic exam of bilateral shoulders, elbows and hands.  Elbow flexion and extension, wrist extension, finger flexion and abduction are well maintained.\par \par   [de-identified] : CAT scans of the cervical and lumbar spine have been reviewed from April 2023 that shows cervical spondylosis there appears to be a lumbar disc protrusion which she can see on CAT scan cervical CAT scan I cannot see any maine or large herniated disks or secondary imaging/shadows of a herniated disc.

## 2023-04-20 NOTE — DISCUSSION/SUMMARY
[de-identified] : Based upon no significant disc herniations is visualized on this CAT scan and since her significant Endor persistent cervical radiculopathy I would personally recommend obtaining a cervical MRI for better diagnostics recommend getting the body jewelry removed.  Refill of a Medrol Dosepak physical modalities pain management has been referred patient can follow-up if she obtains an MRI for better diagnostics and surgical decision making or recommendations if needed.

## 2023-05-18 ENCOUNTER — APPOINTMENT (OUTPATIENT)
Dept: ORTHOPEDIC SURGERY | Facility: CLINIC | Age: 43
End: 2023-05-18

## 2023-06-02 NOTE — ED ADULT NURSE NOTE - AS SC BRADEN NUTRITION
[Fine Needle Aspiration] : Fine needle aspiration ~T ~C was performed.
[Risks] : risks
[Benefits] : benefits
[Alternatives] : alternatives
[Consent Obtained] : Written consent was obtained prior to the procedure and is detailed in the patient's record
[Patient] : the patient
[Ethyl Chloride] : ethyl chloride
[Supine] : The patient was placed in the supine position with the neck extended as tolerated.
[Alcohol] : with alcohol
[25 gauge 1.5 inch] : A 25 gauge 1.5 inch needle was used
[3 Passes] : 3 passes were made through the mass
[Ultrasonic Guidance] : ultrasound guidance was employed
[Sent to Histology] : The specimens were prepared in the usual manner and sent to histology.
[Afirma] : Afirma
[Tolerated Well] : the patient tolerated the procedure well
[Vital Signs Stable] : the vital signs were stable
[Hemostasis] : hemostasis was assured and the patient was discharged in satisfactory condition
[No Complications] : There were no complications
[Instructions Given] : Handouts/patient instructions were given to patient
[PRN] : as needed.
[de-identified] : L and Isthmus nodules
[de-identified] : per nodule
no
(3) adequate

## 2023-06-06 ENCOUNTER — APPOINTMENT (OUTPATIENT)
Dept: ORTHOPEDIC SURGERY | Facility: CLINIC | Age: 43
End: 2023-06-06

## 2023-06-27 NOTE — ED ADULT NURSE NOTE - CAS EDN DISCHARGE ASSESSMENT
Alert and oriented to person, place and time Initiate Treatment: Efudex 5 % topical cream BID\\nQuantity: 40.0 g  Days Supply: 30\\nSig: Apply topically to forehead and nose twice a day for 14 weeks then stop. Detail Level: Zone

## 2023-07-18 NOTE — PROGRESS NOTE ADULT - SUBJECTIVE AND OBJECTIVE BOX
Chart reviewed, immunization record updated.  No LINKS registry.   No new results noted on [facility] or [facility] web site.  Care Everywhere updated.   Patient care coordination note updated.   Upcoming PCP visit updated.  Next PCP visit 08/01/2023.  LOV with PCP 07/10/2023.  Received external Diabetic Eye Exam collected/ completed on 06/01/2023, updated to .       POD # 1  Doing well  AVSS  Offers no complaints

## 2024-02-08 NOTE — ED ADULT NURSE NOTE - BOWEL SOUNDS RUQ
present 32 y female presents to the ED AAOx4 ambulatory with complaints of abd pain x 2 days with light vaginal spotting.  LMP 12/15. Pt abd is nontender on palpation. Pt denies any trauma to abd. Pt endorsing having intermitted episodes of dysuria Pt Denies headache, dizziness, vision changes, chest pain, shortness of breath, n, nausea, vomiting, diarrhea, fevers, chills,, hematuria, recent illness travel or fall.

## 2025-04-23 NOTE — PATIENT PROFILE OB - NS PRO TALK SOMEONE YN
Pharmacist Admission Medication History    Admission medication history is complete. The information provided in this note is only as accurate as the sources available at the time of the update.    Information Source(s): Patient and CareEverywhere/SureScripts via in-person    Pertinent Information: none    Changes made to PTA medication list:  Added: pregabalin, ropinirole, duloxetine, methocarbamol, tums, multivitamin,   Deleted: alendronate, nortriptyline, prednisone, ranitidine, vitamin C, magnesium, iron, potassium   Changed: levothyroxine, vitamin D    Allergies reviewed with patient and updates made in EHR: yes    Medication History Completed By: DOMINIK DE OLIVEIRA RPH 4/23/2025 6:36 AM    PTA Med List   Medication Sig Last Dose/Taking    acetaminophen (TYLENOL 8 HOUR ARTHRITIS PAIN) 650 MG CR tablet Take 1,300 mg by mouth every 8 hours as needed for pain. 4/22/2025 Evening    calcium carbonate 750 MG CHEW Take 750 mg by mouth daily. 4/22/2025 Morning    DULoxetine (CYMBALTA) 30 MG capsule Take 30 mg by mouth every morning. 4/23/2025 Morning    DULoxetine (CYMBALTA) 60 MG capsule Take 60 mg by mouth every evening. 4/22/2025 Evening    levothyroxine (SYNTHROID/LEVOTHROID) 88 MCG tablet Take 88 mcg by mouth daily. 4/23/2025 Morning    methocarbamol (ROBAXIN) 750 MG tablet Take 750 mg by mouth 4 times daily as needed for muscle spasms. 4/22/2025 Evening    multivitamin, therapeutic (THERA-VIT) TABS tablet Take 1 tablet by mouth daily. Past Week Morning    pregabalin (LYRICA) 300 MG capsule Take 300 mg by mouth 2 times daily. 4/23/2025 Morning    rOPINIRole (REQUIP) 1 MG tablet Take 1 mg by mouth at bedtime. 4/22/2025 Bedtime    vitamin B-12 (CYANOCOBALAMIN) 1000 MCG tablet Take 1,000 mcg by mouth daily. 4/22/2025 Morning    vitamin D2 (ERGOCALCIFEROL) 02316 units (1250 mcg) capsule Take 50,000 Units by mouth twice a week. 4/21/2025 Morning      no